# Patient Record
Sex: MALE | Race: WHITE | Employment: FULL TIME | URBAN - METROPOLITAN AREA
[De-identification: names, ages, dates, MRNs, and addresses within clinical notes are randomized per-mention and may not be internally consistent; named-entity substitution may affect disease eponyms.]

---

## 2017-06-08 ENCOUNTER — OFFICE VISIT (OUTPATIENT)
Dept: FAMILY MEDICINE | Facility: CLINIC | Age: 32
End: 2017-06-08
Payer: COMMERCIAL

## 2017-06-08 VITALS
BODY MASS INDEX: 45.1 KG/M2 | WEIGHT: 315 LBS | HEART RATE: 112 BPM | TEMPERATURE: 99.2 F | HEIGHT: 70 IN | DIASTOLIC BLOOD PRESSURE: 93 MMHG | SYSTOLIC BLOOD PRESSURE: 133 MMHG

## 2017-06-08 DIAGNOSIS — L03.116 CELLULITIS OF LEFT LOWER EXTREMITY: Primary | ICD-10-CM

## 2017-06-08 DIAGNOSIS — J30.1 SEASONAL ALLERGIC RHINITIS DUE TO POLLEN: ICD-10-CM

## 2017-06-08 PROCEDURE — 99214 OFFICE O/P EST MOD 30 MIN: CPT | Performed by: FAMILY MEDICINE

## 2017-06-08 RX ORDER — CEPHALEXIN 500 MG/1
500 CAPSULE ORAL 4 TIMES DAILY
Qty: 40 CAPSULE | Refills: 0 | Status: SHIPPED | OUTPATIENT
Start: 2017-06-08 | End: 2018-02-21

## 2017-06-08 NOTE — MR AVS SNAPSHOT
After Visit Summary   6/8/2017    Michel Bradshaw    MRN: [id number]           Patient Information     Date Of Birth          1985        Visit Information        Provider Department      6/8/2017 2:00 PM Margarette Lowery MD [facility]        Today's Diagnoses     Cellulitis of left lower extremity    -  1    Seasonal allergic rhinitis due to pollen           Follow-ups after your visit        Additional Services     ALLERGY/ASTHMA ADULT REFERRAL       Your provider has referred you to: G: [facility] [phone] [email or web address]    Please be aware that coverage of these services is subject to the terms and limitations of your health insurance plan.  Call member services at your health plan with any benefit or coverage questions.      Please bring the following with you to your appointment:    (1) Any X-Rays, CTs or MRIs which have been performed.  Contact the facility where they were done to arrange for  prior to your scheduled appointment.    (2) List of current medications  (3) This referral request   (4) Any documents/labs given to you for this referral                  Who to contact     If you have questions or need follow up information about today's clinic visit or your schedule please contact [facility] directly at [phone].  Normal or non-critical lab and imaging results will be communicated to you by MyChart, letter or phone within 4 business days after the clinic has received the results. If you do not hear from us within 7 days, please contact the clinic through MyChart or phone. If you have a critical or abnormal lab result, we will notify you by phone as soon as possible.  Submit refill requests through [employer] or call your pharmacy and they will forward the refill request to us. Please allow 3 business days for your refill to be completed.          Additional Information About Your Visit    "     InCasthart Information     "[employer] lets you send messages to your doctor, view your test results, renew your prescriptions, schedule appointments and more. To sign up, go to [email or web address] . Click on \"Log in\" on the left side of the screen, which will take you to the Welcome page. Then click on \"Sign up Now\" on the right side of the page.     You will be asked to enter the access code listed below, as well as some personal information. Please follow the directions to create your username and password.     Your access code is: [email or web address]  Expires: 2017  2:20 PM     Your access code will  in 90 days. If you need help or a new code, please call your [place] clinic or [phone].        Care EveryWhere ID     This is your Care EveryWhere ID. This could be used by other organizations to access your [place] medical records  [id number]        Your Vitals Were     Pulse Temperature Height BMI (Body Mass Index)          112 99.2  F (37.3  C) (Tympanic) 5' 10\" (1.778 m) 48.93 kg/m2         Blood Pressure from Last 3 Encounters:   17 (!) 133/93   12 146/86   08 142/84    Weight from Last 3 Encounters:   17 (!) 341 lb (154.7 kg)   12 288 lb 9.6 oz (130.9 kg)   08 (!) 325 lb 12.8 oz (147.8 kg)              We Performed the Following     ALLERGY/ASTHMA ADULT REFERRAL          Today's Medication Changes          These changes are accurate as of: 17  2:20 PM.  If you have any questions, ask your nurse or doctor.               Start taking these medicines.        Dose/Directions    cephALEXin 500 MG capsule   Commonly known as:  KEFLEX   Used for:  Cellulitis of left lower extremity   Started by:  Margarette Lowery MD        Dose:  500 mg   Take 1 capsule (500 mg) by mouth 4 times daily   Quantity:  40 capsule   Refills:  0            Where to get your medicines      These medications were sent to [address]" [facility] [address]     Phone:  [phone]     cephALEXin 500 MG capsule                Primary Care Provider Office Phone # Fax #    Margarette Liliane Lowery [phone]       [facility] [address] [facility] [address]        Thank you!     Thank you for choosing [facility]  for your care. Our goal is always to provide you with excellent care. Hearing back from our patients is one way we can continue to improve our services. Please take a few minutes to complete the written survey that you may receive in the mail after your visit with us. Thank you!             Your Updated Medication List - Protect others around you: Learn how to safely use, store and throw away your medicines at www.disposemymeds.org.          This list is accurate as of: 6/8/17  2:20 PM.  Always use your most recent med list.                   Brand Name Dispense Instructions for use    BENADRYL 25 MG capsule   Generic drug:  diphenhydrAMINE     56    1 CAPSULE EVERY 4 TO 6 HOURS AS NEEDED       cephALEXin 500 MG capsule    KEFLEX    40 capsule    Take 1 capsule (500 mg) by mouth 4 times daily

## 2017-06-08 NOTE — NURSING NOTE
"Initial BP (!) 133/93  Pulse 112  Temp 99.2  F (37.3  C) (Tympanic)  Ht 5' 10\" (1.778 m)  Wt (!) 341 lb (154.7 kg)  BMI 48.93 kg/m2 Estimated body mass index is 48.93 kg/(m^2) as calculated from the following:    Height as of this encounter: 5' 10\" (1.778 m).    Weight as of this encounter: 341 lb (154.7 kg). .      "

## 2017-06-08 NOTE — PROGRESS NOTES
"  SUBJECTIVE:                                                    Michel Bradshaw is 31 year old male   Chief Complaint   Patient presents with     Leg Pain     Left leg edema      Duration: 2 days    Description  Location: left leg    Intensity:  0/10 and no itching    Accompanying signs and symptoms: swelling and redness    History  Previous similar problem: no   Previous evaluation:  none    Precipitating or alleviating factors:  Trauma or overuse: no   Aggravating factors include: none    Therapies tried and outcome: nothing       Problem list and histories reviewed & adjusted, as indicated.  Additional history: many mosquito bites this weekend.    Patient Active Problem List   Diagnosis     Other acute otitis externa     Acute otitis media     History reviewed. No pertinent surgical history.    Social History   Substance Use Topics     Smoking status: Never Smoker     Smokeless tobacco: Never Used     Alcohol use Yes     History reviewed. No pertinent family history.      Current Outpatient Prescriptions   Medication Sig Dispense Refill     cephALEXin (KEFLEX) 500 MG capsule Take 1 capsule (500 mg) by mouth 4 times daily 40 capsule 0     BENADRYL 25 MG OR CAPS 1 CAPSULE EVERY 4 TO 6 HOURS AS NEEDED 56 0     No Known Allergies  No lab results found.   BP Readings from Last 3 Encounters:   06/08/17 (!) 133/93   09/20/12 146/86   05/22/08 142/84    Wt Readings from Last 3 Encounters:   06/08/17 (!) 341 lb (154.7 kg)   09/20/12 288 lb 9.6 oz (130.9 kg)   05/22/08 (!) 325 lb 12.8 oz (147.8 kg)         ROS:  Constitutional, HEENT, cardiovascular, pulmonary, gi and gu systems are negative, except as otherwise noted.    OBJECTIVE:                                                    BP (!) 133/93  Pulse 112  Temp 99.2  F (37.3  C) (Tympanic)  Ht 5' 10\" (1.778 m)  Wt (!) 341 lb (154.7 kg)  BMI 48.93 kg/m2  GENERAL APPEARANCE ADULT: Alert, no acute distress, morbidly obese  SKIN: rash present, type:macular, " appearance: red, location: left leg and ankle.  Many red inflamed excoriated mosquito bites on both legs.  Diagnostic Test Results:  none      ASSESSMENT/PLAN:                                                    1. Cellulitis of left lower extremity  Recheck in 2 days, if worse go to ED.  - cephALEXin (KEFLEX) 500 MG capsule; Take 1 capsule (500 mg) by mouth 4 times daily  Dispense: 40 capsule; Refill: 0    2. Seasonal allergic rhinitis due to pollen  Wishes work up for allergies.  - ALLERGY/ASTHMA ADULT REFERRAL    Margarette Lowery MD  [facility]

## 2017-06-08 NOTE — LETTER
[facility]  [address] [place] [address]  [phone]    RE:  Michel Bradshaw  [address] [address] [phone] (home)   June 8, 2017    TO WHOM IT MAY CONCERN:    Michel Bradshaw was seen on 6/8/2017.  Please excuse him until better due to illness.    Cordially,      ONEIL VELASQUEZ MD.

## 2017-06-12 ENCOUNTER — OFFICE VISIT (OUTPATIENT)
Dept: FAMILY MEDICINE | Facility: CLINIC | Age: 32
End: 2017-06-12
Payer: COMMERCIAL

## 2017-06-12 VITALS
DIASTOLIC BLOOD PRESSURE: 87 MMHG | TEMPERATURE: 98.4 F | HEIGHT: 70 IN | WEIGHT: 315 LBS | HEART RATE: 93 BPM | BODY MASS INDEX: 45.1 KG/M2 | SYSTOLIC BLOOD PRESSURE: 137 MMHG

## 2017-06-12 DIAGNOSIS — L03.119 CELLULITIS AND ABSCESS OF LEG: Primary | ICD-10-CM

## 2017-06-12 DIAGNOSIS — L02.419 CELLULITIS AND ABSCESS OF LEG: Primary | ICD-10-CM

## 2017-06-12 PROCEDURE — 99213 OFFICE O/P EST LOW 20 MIN: CPT | Performed by: FAMILY MEDICINE

## 2017-06-12 NOTE — PATIENT INSTRUCTIONS
Thank you for choosing [facility].  You may be receiving a survey in the mail from [employer] Prieto regarding your visit today.  Please take a few minutes to complete and return the survey to let us know how we are doing.      If you have questions or concerns, please contact us via [employer] or you can contact your care team at [phone].    Our Clinic hours are:  Monday 6:40 am  to 7:00 pm  Tuesday -Friday 6:40 am to 5:00 pm    The [place] outpatient lab hours are:  Monday - Friday 6:10 am to 4:45 pm  Saturdays 7:00 am to 11:00 am  Appointments are required, call [phone]    If you have clinical questions after hours or would like to schedule an appointment,  call the clinic at [phone].  Discharge Instructions for Cellulitis  You have been diagnosed with cellulitis. This is an infection in the deepest layer of the skin. In some cases, the infection also affects the muscle. Cellulitis is caused by bacteria. The bacteria can enter the body through broken skin. This can happen with a cut, scratch, animal bite, or an insect bite that has been scratched. You may have been treated in the hospital with antibiotics and fluids. You will likely be given a prescription for antibiotics to take at home. This sheet will help you take care of yourself at home.  Home Care  When you are home:    Take the prescribed antibiotic medication you are given as directed until it is gone. Take it even if you feel better. It treats the infection and stops it from returning. Not taking all of the medication can make future infections hard to treat.    Keep the infected area clean.    When possible, raise the infected area above the level of your heart. This helps keep swelling down.    Talk to your doctor if you are in pain. Ask what kind of over-the-counter medication you can take for pain.    Apply clean bandages as advised.    Take your temperature once a day for a week.    Wash your hands often to prevent spreading the  infection.  In the future, wash your hands before and after you touch cuts, scratches, or bandages. This will help prevent infection.   When to Call Your Doctor  Call your doctor immediately if you have any of the following:    Vomiting    Fever of100.4 F (38 C) or higher, or as directed by your health care provider    Shaking chills    Redness that gets worse in or around the infected area    Swelling of the infected area    Pain that gets worse in or around the infected area    Difficulty or pain when moving the joints above or below the infected area    Discharge or pus draining from the area     2896-1023 [employer]. [address] [place] [address]. All rights reserved. This information is not intended as a substitute for professional medical care. Always follow your healthcare professional's instructions.

## 2017-06-12 NOTE — PROGRESS NOTES
SUBJECTIVE:                                                    Michel Bradshaw is a 31 year old male who presents to clinic today for the following health issues:      Concern - Patient is here for a F/U Cellulitis L leg      Onset: 1 week ago     Description:   Patient was seen by Dr. Lowery and was but on Keflex is getting better need ok to go back to work     Intensity: mild    Progression of Symptoms:  improving    Accompanying Signs & Symptoms:  Bug bites        Previous history of similar problem:   no    Precipitating factors:   Worsened by: the infection from bug bites     Alleviating factors:  Improved by: keflex        Therapies Tried and outcome: Patient was seen on 6-8 in clinic and was treated L leg is getting better       Patient is a 31 yr old male here for a follow up on cellulitis. He was diagnosed on 6/8 and it appears that he is getting better. He reports that the redness and swelling are completely gone. He is still on antibiotics and he was advised to complete the antibiotics.       Problem list and histories reviewed & adjusted, as indicated.  Additional history: as documented    Patient Active Problem List   Diagnosis     Other acute otitis externa     Acute otitis media     History reviewed. No pertinent surgical history.    Social History   Substance Use Topics     Smoking status: Never Smoker     Smokeless tobacco: Never Used     Alcohol use Yes     History reviewed. No pertinent family history.      Current Outpatient Prescriptions   Medication Sig Dispense Refill     cephALEXin (KEFLEX) 500 MG capsule Take 1 capsule (500 mg) by mouth 4 times daily 40 capsule 0     BENADRYL 25 MG OR CAPS 1 CAPSULE EVERY 4 TO 6 HOURS AS NEEDED 56 0     No Known Allergies    Reviewed and updated as needed this visit by clinical staff  Tobacco  Allergies  Med Hx  Surg Hx  Fam Hx  Soc Hx      Reviewed and updated as needed this visit by Provider         ROS:  Constitutional, HEENT, cardiovascular,  "pulmonary, gi and gu systems are negative, except as otherwise noted.    OBJECTIVE:                                                    /87  Pulse 93  Temp 98.4  F (36.9  C) (Tympanic)  Ht 5' 10\" (1.778 m)  Wt (!) 341 lb (154.7 kg)  BMI 48.93 kg/m2  Body mass index is 48.93 kg/(m^2).  GENERAL: healthy, alert and no distress  NECK: no adenopathy, no asymmetry, masses, or scars and thyroid normal to palpation  RESP: lungs clear to auscultation - no rales, rhonchi or wheezes  CV: regular rate and rhythm, normal S1 S2, no S3 or S4, no murmur, click or rub, no peripheral edema and peripheral pulses strong  ABDOMEN: soft, nontender, no hepatosplenomegaly, no masses and bowel sounds normal  MS: +1 edema to knee and LLE exam shows no erythema, induration, or nodules    Diagnostic Test Results:  none      ASSESSMENT/PLAN:                                                      (L02.419,  L03.119) Cellulitis and abscess of leg  (primary encounter diagnosis)  Comment: Resolved  Plan: No change ,continue with antibiotics.     FUTURE APPOINTMENTS:       - Follow-up visit as needed    Trevor Matthews MD  [facility]  "

## 2017-06-12 NOTE — MR AVS SNAPSHOT
After Visit Summary   6/12/2017    Michel Bradshaw    MRN: [id number]           Patient Information     Date Of Birth          1985        Visit Information        Provider Department      6/12/2017 7:20 AM Trevor Matthews MD [facility]        Care Instructions          Thank you for choosing [facility].  You may be receiving a survey in the mail from [facility] regarding your visit today.  Please take a few minutes to complete and return the survey to let us know how we are doing.      If you have questions or concerns, please contact us via [employer] or you can contact your care team at [phone].    Our Clinic hours are:  Monday 6:40 am  to 7:00 pm  Tuesday -Friday 6:40 am to 5:00 pm    The [place] outpatient lab hours are:  Monday - Friday 6:10 am to 4:45 pm  Saturdays 7:00 am to 11:00 am  Appointments are required, call [phone]    If you have clinical questions after hours or would like to schedule an appointment,  call the clinic at [phone].  Discharge Instructions for Cellulitis  You have been diagnosed with cellulitis. This is an infection in the deepest layer of the skin. In some cases, the infection also affects the muscle. Cellulitis is caused by bacteria. The bacteria can enter the body through broken skin. This can happen with a cut, scratch, animal bite, or an insect bite that has been scratched. You may have been treated in the hospital with antibiotics and fluids. You will likely be given a prescription for antibiotics to take at home. This sheet will help you take care of yourself at home.  Home Care  When you are home:    Take the prescribed antibiotic medication you are given as directed until it is gone. Take it even if you feel better. It treats the infection and stops it from returning. Not taking all of the medication can make future infections hard to treat.    Keep the infected area clean.    When possible, raise the infected  area above the level of your heart. This helps keep swelling down.    Talk to your doctor if you are in pain. Ask what kind of over-the-counter medication you can take for pain.    Apply clean bandages as advised.    Take your temperature once a day for a week.    Wash your hands often to prevent spreading the infection.  In the future, wash your hands before and after you touch cuts, scratches, or bandages. This will help prevent infection.   When to Call Your Doctor  Call your doctor immediately if you have any of the following:    Vomiting    Fever of100.4 F (38 C) or higher, or as directed by your health care provider    Shaking chills    Redness that gets worse in or around the infected area    Swelling of the infected area    Pain that gets worse in or around the infected area    Difficulty or pain when moving the joints above or below the infected area    Discharge or pus draining from the area     6932-6206 [employer]. [address] [place] [address]. All rights reserved. This information is not intended as a substitute for professional medical care. Always follow your healthcare professional's instructions.                Follow-ups after your visit        Who to contact     If you have questions or need follow up information about today's clinic visit or your schedule please contact [facility] directly at [phone].  Normal or non-critical lab and imaging results will be communicated to you by MyChart, letter or phone within 4 business days after the clinic has received the results. If you do not hear from us within 7 days, please contact the clinic through MyChart or phone. If you have a critical or abnormal lab result, we will notify you by phone as soon as possible.  Submit refill requests through [employer] or call your pharmacy and they will forward the refill request to us. Please allow 3 business days for your refill to be completed.          Additional  "Information About Your Visit        MyChart Information     [employer] lets you send messages to your doctor, view your test results, renew your prescriptions, schedule appointments and more. To sign up, go to [email or web address] . Click on \"Log in\" on the left side of the screen, which will take you to the Welcome page. Then click on \"Sign up Now\" on the right side of the page.     You will be asked to enter the access code listed below, as well as some personal information. Please follow the directions to create your username and password.     Your access code is: [email or web address]  Expires: 2017  2:20 PM     Your access code will  in 90 days. If you need help or a new code, please call your [place] clinic or [phone].        Care EveryWhere ID     This is your Care EveryWhere ID. This could be used by other organizations to access your [place] medical records  [id number]        Your Vitals Were     Pulse Temperature Height BMI (Body Mass Index)          93 98.4  F (36.9  C) (Tympanic) 5' 10\" (1.778 m) 48.93 kg/m2         Blood Pressure from Last 3 Encounters:   17 137/87   17 (!) 133/93   12 146/86    Weight from Last 3 Encounters:   17 (!) 341 lb (154.7 kg)   17 (!) 341 lb (154.7 kg)   12 288 lb 9.6 oz (130.9 kg)              Today, you had the following     No orders found for display       Primary Care Provider Office Phone # Fax #    Margarettevidal Lowery -[phone]       [facility] [address] [facility] [address]        Thank you!     Thank you for choosing [facility]  for your care. Our goal is always to provide you with excellent care. Hearing back from our patients is one way we can continue to improve our services. Please take a few minutes to complete the written survey that you may receive in the mail after your visit with us. Thank you!             Your Updated Medication List - Protect others around you: " Learn how to safely use, store and throw away your medicines at www.disposemymeds.org.          This list is accurate as of: 6/12/17  7:53 AM.  Always use your most recent med list.                   Brand Name Dispense Instructions for use    BENADRYL 25 MG capsule   Generic drug:  diphenhydrAMINE     56    1 CAPSULE EVERY 4 TO 6 HOURS AS NEEDED       cephALEXin 500 MG capsule    KEFLEX    40 capsule    Take 1 capsule (500 mg) by mouth 4 times daily

## 2017-06-12 NOTE — NURSING NOTE
"Chief Complaint   Patient presents with     Cellulitis     L leg        Initial /87  Pulse 93  Temp 98.4  F (36.9  C) (Tympanic)  Ht 5' 10\" (1.778 m)  Wt (!) 341 lb (154.7 kg)  BMI 48.93 kg/m2 Estimated body mass index is 48.93 kg/(m^2) as calculated from the following:    Height as of this encounter: 5' 10\" (1.778 m).    Weight as of this encounter: 341 lb (154.7 kg).  Medication Reconciliation: complete  "

## 2018-02-21 ENCOUNTER — OFFICE VISIT (OUTPATIENT)
Dept: FAMILY MEDICINE | Facility: CLINIC | Age: 33
End: 2018-02-21
Payer: COMMERCIAL

## 2018-02-21 ENCOUNTER — OFFICE VISIT (OUTPATIENT)
Dept: DERMATOLOGY | Facility: CLINIC | Age: 33
End: 2018-02-21
Payer: COMMERCIAL

## 2018-02-21 VITALS
SYSTOLIC BLOOD PRESSURE: 141 MMHG | HEART RATE: 92 BPM | BODY MASS INDEX: 51.19 KG/M2 | HEIGHT: 71 IN | DIASTOLIC BLOOD PRESSURE: 97 MMHG

## 2018-02-21 VITALS
HEART RATE: 97 BPM | WEIGHT: 315 LBS | DIASTOLIC BLOOD PRESSURE: 88 MMHG | TEMPERATURE: 96.9 F | HEIGHT: 70 IN | BODY MASS INDEX: 45.1 KG/M2 | SYSTOLIC BLOOD PRESSURE: 149 MMHG

## 2018-02-21 DIAGNOSIS — I87.8 VENOUS STASIS: Primary | ICD-10-CM

## 2018-02-21 DIAGNOSIS — E66.01 MORBID OBESITY (H): ICD-10-CM

## 2018-02-21 DIAGNOSIS — I87.2 VENOUS STASIS DERMATITIS OF LEFT LOWER EXTREMITY: Primary | ICD-10-CM

## 2018-02-21 DIAGNOSIS — I83.892 VARICOSE VEINS OF LEFT LEG WITH EDEMA: ICD-10-CM

## 2018-02-21 DIAGNOSIS — I87.2 VENOUS STASIS DERMATITIS OF LEFT LOWER EXTREMITY: ICD-10-CM

## 2018-02-21 PROCEDURE — 99214 OFFICE O/P EST MOD 30 MIN: CPT | Performed by: NURSE PRACTITIONER

## 2018-02-21 PROCEDURE — 99203 OFFICE O/P NEW LOW 30 MIN: CPT | Performed by: PHYSICIAN ASSISTANT

## 2018-02-21 RX ORDER — TRIAMCINOLONE ACETONIDE 1 MG/G
CREAM TOPICAL
Qty: 45 G | Refills: 1 | Status: SHIPPED | OUTPATIENT
Start: 2018-02-21 | End: 2020-06-26

## 2018-02-21 NOTE — PATIENT INSTRUCTIONS
1. Apply Aquaphor or Eucerin cream to you leg  2. Schedule appointment with dermatology  3. Low slat diet  4. Monitor for an increase in redness/warmth and then call clinic          Thank you for choosing [facility].  You may be receiving a survey in the mail from [employer] Prieto regarding your visit today.  Please take a few minutes to complete and return the survey to let us know how we are doing.      If you have questions or concerns, please contact us via [email or web address] or you can contact your care team at [phone].    Our Clinic hours are:  Monday 6:40 am  to 7:00 pm  Tuesday -Friday 6:40 am to 5:00 pm    The [place] outpatient lab hours are:  Monday - Friday 6:10 am to 4:45 pm  Saturdays 7:00 am to 11:00 am  Appointments are required, call [phone]    If you have clinical questions after hours or would like to schedule an appointment,  call the clinic at [phone].

## 2018-02-21 NOTE — PROGRESS NOTES
Michel Bradshaw is a 32 year old year old male patient here today for consult on redness, swelling to left lower leg by Gabriela Fierro CNP.  Patient reports that he had Cellulitis after insect bites in June which resolved with Keflex. He noted mild redness and swelling on left leg for the past week, and reports that it does resolve after having his feet up at night but then returns. He denies any pain. Patient has no other skin complaints today.  Remainder of the HPI, Meds, PMH, Allergies, FH, and SH was reviewed in chart.   History reviewed. No pertinent past medical history.    History reviewed. No pertinent surgical history.     History reviewed. No pertinent family history.    Social History     Social History     Marital status: Single     Spouse name: N/A     Number of children: N/A     Years of education: N/A     Occupational History     Not on file.     Social History Main Topics     Smoking status: Never Smoker     Smokeless tobacco: Never Used     Alcohol use Yes     Drug use: Not on file     Sexual activity: Not on file     Other Topics Concern     Parent/Sibling W/ Cabg, Mi Or Angioplasty Before 65f 55m? No     Social History Narrative       Outpatient Encounter Prescriptions as of 2/21/2018   Medication Sig Dispense Refill     Elastic Bandages & Supports (JOBST FOR MEN KNEE HIGH/LG) MISC Dispense knee high 20-30 mm Hg jobst compression stocking 2 each 1     triamcinolone (KENALOG) 0.1 % cream Apply twice daily to rash on leg as needed. 45 g 1     BENADRYL 25 MG OR CAPS 1 CAPSULE EVERY 4 TO 6 HOURS AS NEEDED 56 0     No facility-administered encounter medications on file as of 2/21/2018.              Review Of Systems  Skin: As above  Eyes: negative  Ears/Nose/Throat: negative  Respiratory: No shortness of breath, dyspnea on exertion, cough, or hemoptysis  Cardiovascular: negative  Gastrointestinal: negative  Genitourinary: negative  Musculoskeletal: negative  Neurologic: negative  Psychiatric:  "negative  Hematologic/Lymphatic/Immunologic: negative  Endocrine: negative      O:   NAD, WDWN, Alert & Oriented, Mood & Affect wnl, Vitals stable   Here today alone   BP (!) 141/97 (BP Location: Left arm, Cuff Size: Adult Large)  Pulse 92  Ht 1.803 m (5' 11\")  BMI 51.19 kg/m2   General appearance normal   Vitals stable   Alert, oriented and in no acute distress     Varicose veins on left leg with 1+ pitting edema  Few blanchable red patches on leg   Prurigo nodule on left shin       Eyes: Conjunctivae/lids:Normal     ENT: Lips: normal    MSK:Normal    Pulm: Breathing Normal    Neuro/Psych: Orientation:Normal; Mood/Affect:Normal  A/P:  1. Venous stasis- venous stasis dermatitis  To reduce swelling in the leg   Don't stand for long periods.   Take regular walks.   Elevate your feet when sitting: if your legs are swollen they need to be above your hips to drain effectively.   Elevate the foot of your bed overnight.   Once the dermatitis is under control, wear special graduated compression stockings long term.    To treat the dermatitis   Topical Triamcinolone cream daily if rash is present   Return to clinic 2 weeks if not improving.   Use vanicream, Eucerin, or aquaphor daily  Try not to scratch: it keeps the dermatitis going.   Protect your skin from injury: this can result in infection or ulceration.    Gave patient prescription for compression stockings.   "

## 2018-02-21 NOTE — MR AVS SNAPSHOT
After Visit Summary   2/21/2018    Michel Bradshaw    MRN: [id number]           Patient Information     Date Of Birth          1985        Visit Information        Provider Department      2/21/2018 8:00 AM Shanice Ferguson PA-C [facility]        Today's Diagnoses     Venous stasis    -  1    Venous stasis dermatitis of left lower extremity          Care Instructions    To reduce swelling in the leg   Don't stand for long periods.   Take regular walks.   Elevate your feet when sitting: if your legs are swollen they need to be above your hips to drain effectively.   Elevate the foot of your bed overnight.   Once the dermatitis is under control, wear special graduated compression stockings long term.    To treat the dermatitis   Topical Triamcinolone cream daily  Return to clinic 2 weeks  Use vanicream daily  Try not to scratch: it keeps the dermatitis going.   Protect your skin from injury: this can result in infection or ulceration.              Follow-ups after your visit        Your next 10 appointments already scheduled     Feb 21, 2018  8:00 AM CST   New Visit with Shanice Ferguson PA-C   [facility]    [address] [address] [phone]            Feb 28, 2018  8:45 AM CST   Nurse Only with [place] FP/IM RN   [facility]    [address] [address] [phone]              Who to contact     If you have questions or need follow up information about today's clinic visit or your schedule please contact [facility] directly at [phone].  Normal or non-critical lab and imaging results will be communicated to you by MyChart, letter or phone within 4 business days after the clinic has received the results. If you do not hear from us within 7 days, please contact the clinic through MyChart or phone. If you have a critical or  "abnormal lab result, we will notify you by phone as soon as possible.  Submit refill requests through [employer] or call your pharmacy and they will forward the refill request to us. Please allow 3 business days for your refill to be completed.          Additional Information About Your Visit        [employer] Information     [employer] lets you send messages to your doctor, view your test results, renew your prescriptions, schedule appointments and more. To sign up, go to [email or web address] . Click on \"Log in\" on the left side of the screen, which will take you to the Welcome page. Then click on \"Sign up Now\" on the right side of the page.     You will be asked to enter the access code listed below, as well as some personal information. Please follow the directions to create your username and password.     Your access code is: [email or web address]  Expires: 2018  7:19 AM     Your access code will  in 90 days. If you need help or a new code, please call your [place] clinic or [phone].        Care EveryWhere ID     This is your Care EveryWhere ID. This could be used by other organizations to access your [place] medical records  [id number]        Your Vitals Were     Pulse Height BMI (Body Mass Index)             92 1.803 m (5' 11\") 51.19 kg/m2          Blood Pressure from Last 3 Encounters:   18 (!) 141/97   18 149/88   17 137/87    Weight from Last 3 Encounters:   18 (!) 166.5 kg (367 lb)   17 (!) 154.7 kg (341 lb)   17 (!) 154.7 kg (341 lb)              Today, you had the following     No orders found for display         Today's Medication Changes          These changes are accurate as of 18  7:59 AM.  If you have any questions, ask your nurse or doctor.               Start taking these medicines.        Dose/Directions    JOBST FOR MEN KNEE HIGH/LG Misc   Used for:  Venous stasis, Venous stasis dermatitis of left lower extremity   Started by:  Shanice Ferguson " JERMAN Mcmahon        Dispense knee high 20-30 mm Hg jobst compression stocking   Quantity:  2 each   Refills:  1       triamcinolone 0.1 % cream   Commonly known as:  KENALOG   Used for:  Venous stasis dermatitis of left lower extremity   Started by:  Shanice Ferguson PA-C        Apply twice daily to rash on leg as needed.   Quantity:  45 g   Refills:  1         Stop taking these medicines if you haven't already. Please contact your care team if you have questions.     cephALEXin 500 MG capsule   Commonly known as:  KEFLEX   Stopped by:  Gabriela Fierro, SHARAD LANGLEY                Where to get your medicines      These medications were sent to [address] [address]     Phone:  [phone]     triamcinolone 0.1 % cream         Some of these will need a paper prescription and others can be bought over the counter.  Ask your nurse if you have questions.     Bring a paper prescription for each of these medications     JOBST FOR MEN KNEE HIGH/LG [facility]                Primary Care Provider Office Phone # Fax #    Margarette Liliane Lowery [phone] [address] [facility] [address]        Equal Access to Services     SARAH BETH ESCOBEDO AH: Hadii josefa rees hadasho Soomaali, waaxda luqadaha, qaybta kaalmada adeegyada, philippe rutledge. So [facility] [phone].    ATENCIÓN: Si habla español, tiene a ward disposición servicios gratuitos de asistencia lingüística. Llame al [phone].    We comply with applicable federal civil rights laws and [place] laws. We do not discriminate on the basis of race, color, national origin, age, disability, sex, sexual orientation, or gender identity.            Thank you!     Thank you for choosing [facility]  for your care. Our goal is always to provide you with excellent care. Hearing back from our patients is one way we can continue to improve our services. Please  take a few minutes to complete the written survey that you may receive in the mail after your visit with us. Thank you!             Your Updated Medication List - Protect others around you: Learn how to safely use, store and throw away your medicines at www.disposemymeds.org.          This list is accurate as of 2/21/18  7:59 AM.  Always use your most recent med list.                   Brand Name Dispense Instructions for use Diagnosis    BENADRYL 25 MG capsule   Generic drug:  diphenhydrAMINE     56    1 CAPSULE EVERY 4 TO 6 HOURS AS NEEDED        JOBST FOR MEN KNEE HIGH/LG Misc     2 each    Dispense knee high 20-30 mm Hg jobst compression stocking    Venous stasis, Venous stasis dermatitis of left lower extremity       triamcinolone 0.1 % cream    KENALOG    45 g    Apply twice daily to rash on leg as needed.    Venous stasis dermatitis of left lower extremity

## 2018-02-21 NOTE — PATIENT INSTRUCTIONS
To reduce swelling in the leg   Don't stand for long periods.   Take regular walks.   Elevate your feet when sitting: if your legs are swollen they need to be above your hips to drain effectively.   Elevate the foot of your bed overnight.   Once the dermatitis is under control, wear special graduated compression stockings long term.    To treat the dermatitis   Topical Triamcinolone cream daily  Return to clinic 2 weeks  Use vanicream daily  Try not to scratch: it keeps the dermatitis going.   Protect your skin from injury: this can result in infection or ulceration.

## 2018-02-21 NOTE — NURSING NOTE
"Chief Complaint   Patient presents with     Derm Problem     Cellulitis       Initial BP (!) 141/97 (BP Location: Left arm, Cuff Size: Adult Large)  Pulse 92  Ht 1.803 m (5' 11\")  BMI 51.19 kg/m2 Estimated body mass index is 51.19 kg/(m^2) as calculated from the following:    Height as of this encounter: 1.803 m (5' 11\").    Weight as of an earlier encounter on 2/21/18: 166.5 kg (367 lb).  Medication Reconciliation: complete    "

## 2018-02-21 NOTE — MR AVS SNAPSHOT
After Visit Summary   2/21/2018    Michel Bradshaw    MRN: [id number]           Patient Information     Date Of Birth          1985        Visit Information        Provider Department      2/21/2018 7:00 AM Gabriela Fierro APRN [facility]        Today's Diagnoses     Venous stasis dermatitis of left lower extremity    -  1      Care Instructions    1. Apply Aquaphor or Eucerin cream to you leg  2. Schedule appointment with dermatology  3. Low slat diet  4. Monitor for an increase in redness/warmth and then call clinic          Thank you for choosing [facility].  You may be receiving a survey in the mail from [employer] regarding your visit today.  Please take a few minutes to complete and return the survey to let us know how we are doing.      If you have questions or concerns, please contact us via [employer] or you can contact your care team at [phone].    Our Clinic hours are:  Monday 6:40 am  to 7:00 pm  Tuesday -Friday 6:40 am to 5:00 pm    The [place] outpatient lab hours are:  Monday - Friday 6:10 am to 4:45 pm  Saturdays 7:00 am to 11:00 am  Appointments are required, call [phone]    If you have clinical questions after hours or would like to schedule an appointment,  call the clinic at [phone].          Follow-ups after your visit        Additional Services     DERMATOLOGY REFERRAL       Your provider has referred you to: [facility]: [facility] [phone]   [email or web address]    Please be aware that coverage of these services is subject to the terms and limitations of your health insurance plan.  Call member services at your health plan with any benefit or coverage questions.      Please bring the following with you to your appointment:    (1) Any X-Rays, CTs or MRIs which have been performed.  Contact the facility where they were done to arrange for  prior to your scheduled appointment.    (2)  "List of current medications  (3) This referral request   (4) Any documents/labs given to you for this referral                  Who to contact     If you have questions or need follow up information about today's clinic visit or your schedule please contact [facility] directly at [phone].  Normal or non-critical lab and imaging results will be communicated to you by MyChart, letter or phone within 4 business days after the clinic has received the results. If you do not hear from us within 7 days, please contact the clinic through MyChart or phone. If you have a critical or abnormal lab result, we will notify you by phone as soon as possible.  Submit refill requests through [employer] or call your pharmacy and they will forward the refill request to us. Please allow 3 business days for your refill to be completed.          Additional Information About Your Visit        MyChart Information     [employer] lets you send messages to your doctor, view your test results, renew your prescriptions, schedule appointments and more. To sign up, go to [email or web address] . Click on \"Log in\" on the left side of the screen, which will take you to the Welcome page. Then click on \"Sign up Now\" on the right side of the page.     You will be asked to enter the access code listed below, as well as some personal information. Please follow the directions to create your username and password.     Your access code is: [email or web address]  Expires: 2018  7:19 AM     Your access code will  in 90 days. If you need help or a new code, please call your [facility] or [phone].        Care EveryWhere ID     This is your Care EveryWhere ID. This could be used by other organizations to access your [place] medical records  [id number]        Your Vitals Were     Pulse Temperature Height BMI (Body Mass Index)          97 96.9  F (36.1  C) (Tympanic) 5' 10\" (1.778 m) 52.66 kg/m2         Blood Pressure from Last 3 " Encounters:   02/21/18 (!) 150/99   06/12/17 137/87   06/08/17 (!) 133/93    Weight from Last 3 Encounters:   02/21/18 (!) 367 lb (166.5 kg)   06/12/17 (!) 341 lb (154.7 kg)   06/08/17 (!) 341 lb (154.7 kg)              We Performed the Following     DERMATOLOGY REFERRAL          Today's Medication Changes          These changes are accurate as of 2/21/18  7:19 AM.  If you have any questions, ask your nurse or doctor.               Stop taking these medicines if you haven't already. Please contact your care team if you have questions.     cephALEXin 500 MG capsule   Commonly known as:  KEFLEX   Stopped by:  Gabriela Fierro APRN CNP                    Primary Care Provider Office Phone # Fax #    Margarette Liliane Lowery [phone] [address] [facility] [address]        Equal Access to Services     SARAH BETH ESCOBEDO AH: Hadii josefa ku hadasho Soomaali, waaxda luqadaha, qaybta kaalmada adeegyada, waxglynn johnsonin hayalyssa penn . So [facility] [phone].    ATENCIÓN: Si habla español, tiene a ward disposición servicios gratuitos de asistencia lingüística. Llame al [phone].    We comply with applicable federal civil rights laws and [place] laws. We do not discriminate on the basis of race, color, national origin, age, disability, sex, sexual orientation, or gender identity.            Thank you!     Thank you for choosing [facility]  for your care. Our goal is always to provide you with excellent care. Hearing back from our patients is one way we can continue to improve our services. Please take a few minutes to complete the written survey that you may receive in the mail after your visit with us. Thank you!             Your Updated Medication List - Protect others around you: Learn how to safely use, store and throw away your medicines at www.disposemymeds.org.          This list is accurate as of 2/21/18  7:19 AM.  Always use your most recent med list.                    Brand Name Dispense Instructions for use Diagnosis    BENADRYL 25 MG capsule   Generic drug:  diphenhydrAMINE     56    1 CAPSULE EVERY 4 TO 6 HOURS AS NEEDED

## 2018-02-21 NOTE — PROGRESS NOTES
"  SUBJECTIVE:   Michel Bradshaw is a 32 year old male who presents to clinic today for the following health issues:    Chief Complaint   Patient presents with     Cellulitis     follow up, a few red spots still, wants checked out to make sure that its healing properly, last seen 6/2017     Patient was treated for cellulitis of left leg in June with Keflex-  He is concerned that his skin in certain area is getting red. No fevers, no drainage, no injury.     -------------------------------------    Problem list and histories reviewed & adjusted, as indicated.  Additional history: as documented    Patient Active Problem List   Diagnosis     Other acute otitis externa     Acute otitis media     History reviewed. No pertinent surgical history.    Social History   Substance Use Topics     Smoking status: Never Smoker     Smokeless tobacco: Never Used     Alcohol use Yes     History reviewed. No pertinent family history.        Reviewed and updated as needed this visit by clinical staff  Tobacco  Allergies  Med Hx  Surg Hx  Fam Hx  Soc Hx      Reviewed and updated as needed this visit by Provider         ROS:  Constitutional, HEENT, cardiovascular, pulmonary, GI, , musculoskeletal, neuro, skin, endocrine and psych systems are negative, except as otherwise noted.    OBJECTIVE:     /88 (BP Location: Left arm, Patient Position: Sitting, Cuff Size: Adult Large)  Pulse 97  Temp 96.9  F (36.1  C) (Tympanic)  Ht 5' 10\" (1.778 m)  Wt (!) 367 lb (166.5 kg)  BMI 52.66 kg/m2  Body mass index is 52.66 kg/(m^2).  GENERAL: alert, no distress and obese  MS: no gross musculoskeletal defects noted, no edema  SKIN: Left LE- with 3-4 scattered quarter sized slightly pigmented area- no warmth or drainage or edema noted.  No streaking.      Diagnostic Test Results:  none     ASSESSMENT/PLAN:       1. Venous stasis dermatitis of left lower extremity  - patient concerned that he is again developing cellulitis like he did 8 " months ago however no concern at this time- encourage him to watch for streaking/increase in redness  - Apply Aquaphor/Eucerin cream  - DERMATOLOGY REFERRAL    2. Morbid Obesity  - discussed with patient contributing factor to venous stasis dermatitis is his weight and that he needs to lose weight   - encouraged to lose weight    SHARAD Lynne [facility]

## 2018-02-21 NOTE — LETTER
2/21/2018         RE: Michel Bradshaw  [address] [address]        Dear Colleague,    Thank you for referring your patient, Michel Bradshaw, to the [facility]. Please see a copy of my visit note below.    Michel Bradshaw is a 32 year old year old male patient here today for consult on redness, swelling to left lower leg by Gabriela Fierro CNP.  Patient reports that he had Cellulitis after insect bites in June which resolved with Keflex. He noted mild redness and swelling on left leg for the past week, and reports that it does resolve after having his feet up at night but then returns. He denies any pain. Patient has no other skin complaints today.  Remainder of the HPI, Meds, PMH, Allergies, FH, and SH was reviewed in chart.   History reviewed. No pertinent past medical history.    History reviewed. No pertinent surgical history.     History reviewed. No pertinent family history.    Social History     Social History     Marital status: Single     Spouse name: N/A     Number of children: N/A     Years of education: N/A     Occupational History     Not on file.     Social History Main Topics     Smoking status: Never Smoker     Smokeless tobacco: Never Used     Alcohol use Yes     Drug use: Not on file     Sexual activity: Not on file     Other Topics Concern     Parent/Sibling W/ Cabg, Mi Or Angioplasty Before 65f 55m? No     Social History Narrative       Outpatient Encounter Prescriptions as of 2/21/2018   Medication Sig Dispense Refill     Elastic Bandages & Supports (JOBST FOR MEN KNEE HIGH/LG) MISC Dispense knee high 20-30 mm Hg jobst compression stocking 2 each 1     triamcinolone (KENALOG) 0.1 % cream Apply twice daily to rash on leg as needed. 45 g 1     BENADRYL 25 MG OR CAPS 1 CAPSULE EVERY 4 TO 6 HOURS AS NEEDED 56 0     No facility-administered encounter medications on file as of 2/21/2018.              Review Of Systems  Skin: As above  Eyes:  "negative  Ears/Nose/Throat: negative  Respiratory: No shortness of breath, dyspnea on exertion, cough, or hemoptysis  Cardiovascular: negative  Gastrointestinal: negative  Genitourinary: negative  Musculoskeletal: negative  Neurologic: negative  Psychiatric: negative  Hematologic/Lymphatic/Immunologic: negative  Endocrine: negative      O:   NAD, WDWN, Alert & Oriented, Mood & Affect wnl, Vitals stable   Here today alone   BP (!) 141/97 (BP Location: Left arm, Cuff Size: Adult Large)  Pulse 92  Ht 1.803 m (5' 11\")  BMI 51.19 kg/m2   General appearance normal   Vitals stable   Alert, oriented and in no acute distress     Varicose veins on left leg with 1+ pitting edema  Few blanchable red patches on leg   Prurigo nodule on left shin       Eyes: Conjunctivae/lids:Normal     ENT: Lips: normal    MSK:Normal    Pulm: Breathing Normal    Neuro/Psych: Orientation:Normal; Mood/Affect:Normal  A/P:  1. Venous stasis- venous stasis dermatitis  To reduce swelling in the leg   Don't stand for long periods.   Take regular walks.   Elevate your feet when sitting: if your legs are swollen they need to be above your hips to drain effectively.   Elevate the foot of your bed overnight.   Once the dermatitis is under control, wear special graduated compression stockings long term.    To treat the dermatitis   Topical Triamcinolone cream daily if rash is present   Return to clinic 2 weeks if not improving.   Use vanicream, Eucerin, or aquaphor daily  Try not to scratch: it keeps the dermatitis going.   Protect your skin from injury: this can result in infection or ulceration.    Gave patient prescription for compression stockings.     Again, thank you for allowing me to participate in the care of your patient.        Sincerely,        Shanice Harry PA-C    "

## 2018-02-26 PROBLEM — E66.01 MORBID OBESITY (H): Status: ACTIVE | Noted: 2018-02-26

## 2018-02-28 ENCOUNTER — ALLIED HEALTH/NURSE VISIT (OUTPATIENT)
Dept: FAMILY MEDICINE | Facility: CLINIC | Age: 33
End: 2018-02-28
Payer: COMMERCIAL

## 2018-02-28 VITALS — DIASTOLIC BLOOD PRESSURE: 78 MMHG | SYSTOLIC BLOOD PRESSURE: 126 MMHG

## 2018-02-28 DIAGNOSIS — I10 HTN (HYPERTENSION): Primary | ICD-10-CM

## 2018-02-28 PROCEDURE — 99207 ZZC NO CHARGE LOS: CPT

## 2018-02-28 NOTE — MR AVS SNAPSHOT
"              After Visit Summary   2018    Michel Bradshaw    MRN: [id number]           Patient Information     Date Of Birth          1985        Visit Information        Provider Department      2018 8:45 AM MANN DOE/HELADIO BENNETT [facility]        Today's Diagnoses     HTN (hypertension)    -  1       Follow-ups after your visit        Who to contact     If you have questions or need follow up information about today's clinic visit or your schedule please contact [facility] directly at [phone].  Normal or non-critical lab and imaging results will be communicated to you by MyChart, letter or phone within 4 business days after the clinic has received the results. If you do not hear from us within 7 days, please contact the clinic through [employer] or phone. If you have a critical or abnormal lab result, we will notify you by phone as soon as possible.  Submit refill requests through [employer] or call your pharmacy and they will forward the refill request to us. Please allow 3 business days for your refill to be completed.          Additional Information About Your Visit        MyChart Information     [employer]ally lets you send messages to your doctor, view your test results, renew your prescriptions, schedule appointments and more. To sign up, go to [email or web address] . Click on \"Log in\" on the left side of the screen, which will take you to the Welcome page. Then click on \"Sign up Now\" on the right side of the page.     You will be asked to enter the access code listed below, as well as some personal information. Please follow the directions to create your username and password.     Your access code is: [email or web address]  Expires: 2018  7:19 AM     Your access code will  in 90 days. If you need help or a new code, please call your [facility] or [phone].        Care EveryWhere ID     This is your Care EveryWhere ID. This could be used by other organizations to " access your [facility] medical records  [id number]         Blood Pressure from Last 3 Encounters:   02/28/18 126/78   02/21/18 (!) 141/97   02/21/18 149/88    Weight from Last 3 Encounters:   02/21/18 (!) 367 lb (166.5 kg)   06/12/17 (!) 341 lb (154.7 kg)   06/08/17 (!) 341 lb (154.7 kg)              Today, you had the following     No orders found for display       Primary Care Provider Office Phone # Fax #    Margarette Liliane Lowery [phone] [address] [facility] [address]        Equal Access to Services     SARAH BETH ESCOBEDO : Hadii josefa forrestero Sozeeshan, waaxda addiqrhona, qaybta kaalmada adejudy, philippe rutledge. So [facility] [phone].    ATENCIÓN: Si habla español, tiene a ward disposición servicios gratuitos de asistencia lingüística. Llame al [phone].    We comply with applicable federal civil rights laws and [place] laws. We do not discriminate on the basis of race, color, national origin, age, disability, sex, sexual orientation, or gender identity.            Thank you!     Thank you for choosing [facility]  for your care. Our goal is always to provide you with excellent care. Hearing back from our patients is one way we can continue to improve our services. Please take a few minutes to complete the written survey that you may receive in the mail after your visit with us. Thank you!             Your Updated Medication List - Protect others around you: Learn how to safely use, store and throw away your medicines at www.disposemymeds.org.          This list is accurate as of 2/28/18  8:48 AM.  Always use your most recent med list.                   Brand Name Dispense Instructions for use Diagnosis    BENADRYL 25 MG capsule   Generic drug:  diphenhydrAMINE     56    1 CAPSULE EVERY 4 TO 6 HOURS AS NEEDED        JOBST FOR MEN KNEE HIGH/LG Misc     2 each    Dispense knee high 20-30 mm Hg jobst compression stocking    Venous stasis, Venous  stasis dermatitis of left lower extremity       triamcinolone 0.1 % cream    KENALOG    45 g    Apply twice daily to rash on leg as needed.    Venous stasis dermatitis of left lower extremity

## 2018-02-28 NOTE — NURSING NOTE
Patient is here to have his Blood pressure rechecked. He is not on any BP medications and denies nosebleeds or headaches. Advised to try to cut back on salty foods and try spices other than salt to season his food.     I reviewed symptoms of Myocardial infarction and CVA with him and stressed importance of keeping BP < 140/90.     Patient verbalized understanding. Sara Ontiveros RN

## 2018-04-21 ENCOUNTER — HOSPITAL ENCOUNTER (EMERGENCY)
Facility: CLINIC | Age: 33
Discharge: HOME | End: 2018-04-21
Attending: EMERGENCY MEDICINE | Admitting: EMERGENCY MEDICINE
Payer: COMMERCIAL

## 2018-04-21 VITALS
OXYGEN SATURATION: 98 % | HEART RATE: 96 BPM | HEIGHT: 71 IN | SYSTOLIC BLOOD PRESSURE: 166 MMHG | TEMPERATURE: 97.7 F | DIASTOLIC BLOOD PRESSURE: 103 MMHG | RESPIRATION RATE: 16 BRPM

## 2018-04-21 DIAGNOSIS — L02.212 CUTANEOUS ABSCESS OF BACK EXCLUDING BUTTOCKS: ICD-10-CM

## 2018-04-21 PROCEDURE — 10060 I&D ABSCESS SIMPLE/SINGLE: CPT | Performed by: EMERGENCY MEDICINE

## 2018-04-21 PROCEDURE — 99283 EMERGENCY DEPT VISIT LOW MDM: CPT | Mod: 25 | Performed by: EMERGENCY MEDICINE

## 2018-04-21 PROCEDURE — 10060 I&D ABSCESS SIMPLE/SINGLE: CPT | Mod: Z6 | Performed by: EMERGENCY MEDICINE

## 2018-04-21 PROCEDURE — 99282 EMERGENCY DEPT VISIT SF MDM: CPT | Mod: 25 | Performed by: EMERGENCY MEDICINE

## 2018-04-21 NOTE — DISCHARGE INSTRUCTIONS
Abscess (Incision & Drainage)  An abscess is sometimes called a boil. It happens when bacteria get trapped under the skin and start to grow. Pus forms inside the abscess as the body responds to the bacteria. An abscess can happen with an insect bite, ingrown hair, blocked oil gland, pimple, cyst, or puncture wound.  Your healthcare provider has drained the pus from your abscess. If the abscess pocket was large, your healthcare provider may have put in gauze packing. Your provider will need to remove it on your next visit. He or she may also replace it at that time. You may not need antibiotics to treat a simple abscess, unless the infection is spreading into the skin around the wound (cellulitis).  The wound will take about 1 to 2 weeks to heal, depending on the size of the abscess. Healthy tissue will grow from the bottom and sides of the opening until it seals over.  Home care  These tips can help your wound heal:    The wound may drain for the first 2 days. Cover the wound with a clean dry dressing. Change the dressing if it becomes soaked with blood or pus.    If a gauze packing was placed inside the abscess pocket, you may be told to remove it yourself. You may do this in the shower. Once the packing is removed, you should wash the area in the shower, or clean the area as directed by your provider. Continue to do this until the skin opening has closed. Make sure you wash your hands after changing the packing or cleaning the wound.    If you were prescribed antibiotics, take them as directed until they are all gone.    You may use acetaminophen or ibuprofen to control pain, unless another pain medicine was prescribed. If you have liver disease or ever had a stomach ulcer, talk with your doctor before using these medicines.  Follow-up care  Follow up with your healthcare provider, or as advised. If a gauze packing was put in your wound, it should be removed in 1 to 2 days. Check your wound every day for any  signs that the infection is getting worse. The signs are listed below.  When to seek medical advice  Call your healthcare provider right away if any of these occur:    Increasing redness or swelling    Red streaks in the skin leading away from the wound    Increasing local pain or swelling    Continued pus draining from the wound 2 days after treatment    Fever of 100.4 F (38 C) or higher, or as directed by your healthcare provider    Boil returns when you are at home  Date Last Reviewed: 9/1/2016 2000-2017 [employer]. [address] [place] [address]. All rights reserved. This information is not intended as a substitute for professional medical care. Always follow your healthcare professional's instructions.      Pull packing tomorrow, warm packs 4-5 times daily for the next 2-3 days

## 2018-04-21 NOTE — ED AVS SNAPSHOT
[facility] Emergency Department    [address] [address]    Phone:  [phone]    Fax:  [phone]                                       Michel Bradshaw   MRN: [id number]    Department:  [facility] Emergency Department   Date of Visit:  4/21/2018           After Visit Summary Signature Page     I have received my discharge instructions, and my questions have been answered. I have discussed any challenges I see with this plan with the nurse or doctor.    ..........................................................................................................................................  Patient/Patient Representative Signature      ..........................................................................................................................................  Patient Representative Print Name and Relationship to Patient    ..................................................               ................................................  Date                                            Time    ..........................................................................................................................................  Reviewed by Signature/Title    ...................................................              ..............................................  Date                                                            Time

## 2018-04-21 NOTE — ED PROVIDER NOTES
"  History     Chief Complaint   Patient presents with     Wound Infection     wound to the middle of his back x 2 weeks. now draining     HPI  Michel Bradshaw is a 32 year old male who presents with swelling and pain upper mid back, present for the past week, no prior cutaneous abscesses.  Denies fever, nausea, faintness.    Problem List:    Patient Active Problem List    Diagnosis Date Noted     Morbid obesity (H) 02/26/2018     Priority: Medium     Other acute otitis externa 05/22/2008     Priority: Medium     Acute otitis media 05/22/2008     Priority: Medium        Past Medical History:    No past medical history on file.    Past Surgical History:    No past surgical history on file.    Family History:    No family history on file.    Social History:  Marital Status:  Single [1]  Social History   Substance Use Topics     Smoking status: Never Smoker     Smokeless tobacco: Never Used     Alcohol use Yes        Medications:      BENADRYL 25 MG OR CAPS   Elastic Bandages & Supports (JOBST FOR MEN KNEE HIGH/LG) MISC   triamcinolone (KENALOG) 0.1 % cream         Review of Systems  Problem focused review of systems otherwise negative    Physical Exam   BP: (!) 166/103  Pulse: 96  Temp: 97.7  F (36.5  C)  Resp: 16  Height: 180.3 cm (5' 11\")  SpO2: 98 %      Physical Exam  Nontoxic-appearing respiratory distress alert and oriented  Examination of her mid back shows area of redness, swelling tenderness centrally measures 4 cm with 2 cm surrounding erythema, central fluctuance.  23  ED Course     ED Course     Procedures               Critical Care time:  none  Procedure: Incision and drainage abscess  Area was prepped with Betadine ×3, anesthetized with 8 cc 0.5% bupivacaine, 1 cm skin line incision central at aspect of the abscess, moderate amount.  The degree expressed, bluntly probed to break up loculations irrigated with saline. Packed with 1/2\" iodoform gauze            No results found for this or any previous " visit (from the past 24 hour(s)).    Medications - No data to display    Assessments & Plan (with Medical Decision Making)  Cutaneous abscess for the past week. I &D, no complications. Return criteria reviewed     I have reviewed the nursing notes.    I have reviewed the findings, diagnosis, plan and need for follow up with the patient.       New Prescriptions    No medications on file       Final diagnoses:   Cutaneous abscess of back excluding buttocks       4/21/2018   [facility] EMERGENCY DEPARTMENT     Roderick Shaikh MD  04/21/18 [id number]

## 2018-04-21 NOTE — ED AVS SNAPSHOT
[facility] Emergency Department    [address] [facility] [facility] [address]    Phone:  [phone]    Fax:  [phone]                                       Michel Bradshaw   MRN: [id number]    Department:  [facility] Emergency Department   Date of Visit:  4/21/2018           Patient Information     Date Of Birth          1985        Your diagnoses for this visit were:     Cutaneous abscess of back excluding buttocks        You were seen by Roderick Shaikh MD.        Discharge Instructions         Abscess (Incision & Drainage)  An abscess is sometimes called a boil. It happens when bacteria get trapped under the skin and start to grow. Pus forms inside the abscess as the body responds to the bacteria. An abscess can happen with an insect bite, ingrown hair, blocked oil gland, pimple, cyst, or puncture wound.  Your healthcare provider has drained the pus from your abscess. If the abscess pocket was large, your healthcare provider may have put in gauze packing. Your provider will need to remove it on your next visit. He or she may also replace it at that time. You may not need antibiotics to treat a simple abscess, unless the infection is spreading into the skin around the wound (cellulitis).  The wound will take about 1 to 2 weeks to heal, depending on the size of the abscess. Healthy tissue will grow from the bottom and sides of the opening until it seals over.  Home care  These tips can help your wound heal:    The wound may drain for the first 2 days. Cover the wound with a clean dry dressing. Change the dressing if it becomes soaked with blood or pus.    If a gauze packing was placed inside the abscess pocket, you may be told to remove it yourself. You may do this in the shower. Once the packing is removed, you should wash the area in the shower, or clean the area as directed by your provider. Continue to do this until the skin opening has closed. Make sure you wash your hands after  changing the packing or cleaning the wound.    If you were prescribed antibiotics, take them as directed until they are all gone.    You may use acetaminophen or ibuprofen to control pain, unless another pain medicine was prescribed. If you have liver disease or ever had a stomach ulcer, talk with your doctor before using these medicines.  Follow-up care  Follow up with your healthcare provider, or as advised. If a gauze packing was put in your wound, it should be removed in 1 to 2 days. Check your wound every day for any signs that the infection is getting worse. The signs are listed below.  When to seek medical advice  Call your healthcare provider right away if any of these occur:    Increasing redness or swelling    Red streaks in the skin leading away from the wound    Increasing local pain or swelling    Continued pus draining from the wound 2 days after treatment    Fever of 100.4 F (38 C) or higher, or as directed by your healthcare provider    Boil returns when you are at home  Date Last Reviewed: 9/1/2016 2000-2017 [employer]. [address] [place] [address]. All rights reserved. This information is not intended as a substitute for professional medical care. Always follow your healthcare professional's instructions.      Pull packing tomorrow, warm packs 4-5 times daily for the next 2-3 days    24 Hour Appointment Hotline       To make an appointment at any [place] clinic, call [phone] ([phone]). If you don't have a family doctor or clinic, we will help you find one. [place] clinics are conveniently located to serve the needs of you and your family.             Review of your medicines      Our records show that you are taking the medicines listed below. If these are incorrect, please call your family doctor or clinic.        Dose / Directions Last dose taken    BENADRYL 25 MG capsule   Quantity:  56   Generic drug:  diphenhydrAMINE        1 CAPSULE EVERY 4 TO 6  "HOURS AS NEEDED   Refills:  0        JOBST FOR MEN KNEE HIGH/LG Misc   Quantity:  2 each        Dispense knee high 20-30 mm Hg jobst compression stocking   Refills:  1        triamcinolone 0.1 % cream   Commonly known as:  KENALOG   Quantity:  45 g        Apply twice daily to rash on leg as needed.   Refills:  1                Orders Needing Specimen Collection     None      Pending Results     No orders found from 4/19/2018 to 4/22/2018.            Pending Culture Results     No orders found from 4/19/2018 to 4/22/2018.            Pending Results Instructions     If you had any lab results that were not finalized at the time of your Discharge, you can call the ED Lab Result RN at [phone]. You will be contacted by this team for any positive Lab results or changes in treatment. The nurses are available 7 days a week from 10A to 6:30P.  You can leave a message 24 hours per day and they will return your call.        Test Results From Your Hospital Stay               Thank you for choosing [facility]       Thank you for choosing [facility] for your care. Our goal is always to provide you with excellent care. Hearing back from our patients is one way we can continue to improve our services. Please take a few minutes to complete the written survey that you may receive in the mail after you visit with us. Thank you!        [employer] Information     [employer] lets you send messages to your doctor, view your test results, renew your prescriptions, schedule appointments and more. To sign up, go to [email or web address] . Click on \"Log in\" on the left side of the screen, which will take you to the Welcome page. Then click on \"Sign up Now\" on the right side of the page.     You will be asked to enter the access code listed below, as well as some personal information. Please follow the directions to create your username and password.     Your access code is: [email or web address]  Expires: 5/22/2018  8:19 AM     Your access code will "  in 90 days. If you need help or a new code, please call your [place] clinic or [phone].        Care EveryWhere ID     This is your Care EveryWhere ID. This could be used by other organizations to access your [place] medical records  [id number]        Equal Access to Services     SARAH BETH ESCOBEDO : Ritesh Pereyra, waaxda luqadaha, qaybta kaalmanorma sinclair, philippe rutledge. So [facility] [phone].    ATENCIÓN: Si habla español, tiene a ward disposición servicios gratuitos de asistencia lingüística. Llame al [phone].    We comply with applicable federal civil rights laws and [place] laws. We do not discriminate on the basis of race, color, national origin, age, disability, sex, sexual orientation, or gender identity.            After Visit Summary       This is your record. Keep this with you and show to your community pharmacist(s) and doctor(s) at your next visit.

## 2018-08-03 ENCOUNTER — OFFICE VISIT (OUTPATIENT)
Dept: FAMILY MEDICINE | Facility: CLINIC | Age: 33
End: 2018-08-03
Payer: COMMERCIAL

## 2018-08-03 VITALS
OXYGEN SATURATION: 100 % | HEART RATE: 102 BPM | HEIGHT: 70 IN | DIASTOLIC BLOOD PRESSURE: 86 MMHG | SYSTOLIC BLOOD PRESSURE: 130 MMHG | TEMPERATURE: 98.3 F | BODY MASS INDEX: 45.1 KG/M2 | WEIGHT: 315 LBS

## 2018-08-03 DIAGNOSIS — I87.2 VENOUS STASIS DERMATITIS OF LEFT LOWER EXTREMITY: Primary | ICD-10-CM

## 2018-08-03 DIAGNOSIS — E66.01 MORBID OBESITY (H): ICD-10-CM

## 2018-08-03 PROCEDURE — 99213 OFFICE O/P EST LOW 20 MIN: CPT | Performed by: NURSE PRACTITIONER

## 2018-08-03 NOTE — MR AVS SNAPSHOT
"              After Visit Summary   8/3/2018    Micehl Bradshaw    MRN: [id number]           Patient Information     Date Of Birth          1985        Visit Information        Provider Department      8/3/2018 3:20 PM Deann Garcia APRN CNP [facility]        Today's Diagnoses     Venous stasis dermatitis of left lower extremity    -  1    Morbid obesity (H)           Follow-ups after your visit        Follow-up notes from your care team     Return if symptoms worsen or fail to improve.      Who to contact     If you have questions or need follow up information about today's clinic visit or your schedule please contact [facility] directly at [phone].  Normal or non-critical lab and imaging results will be communicated to you by MyChart, letter or phone within 4 business days after the clinic has received the results. If you do not hear from us within 7 days, please contact the clinic through MyChart or phone. If you have a critical or abnormal lab result, we will notify you by phone as soon as possible.  Submit refill requests through [employer] or call your pharmacy and they will forward the refill request to us. Please allow 3 business days for your refill to be completed.          Additional Information About Your Visit        Care EveryWhere ID     This is your Care EveryWhere ID. This could be used by other organizations to access your [facility] medical records  [id number]        Your Vitals Were     Pulse Temperature Height Pulse Oximetry BMI (Body Mass Index)       102 98.3  F (36.8  C) (Tympanic) 5' 10\" (1.778 m) 100% 52.11 kg/m2        Blood Pressure from Last 3 Encounters:   08/03/18 130/86   04/21/18 (!) 166/103   02/28/18 126/78    Weight from Last 3 Encounters:   08/03/18 (!) 363 lb 3.2 oz (164.7 kg)   02/21/18 (!) 367 lb (166.5 kg)   06/12/17 (!) 341 lb (154.7 kg)              Today, you had the following     No orders found for display       Primary Care " Provider Office Phone # Fax #    Margarette Liliane Lowery [phone] [address] [facility] [facility] [address]        Equal Access to Services     SARAH BETH ESCOBEDO : Hadfrandy rees mitzyo Soclaudeali, waaxda luqadaha, qaybta kaalmada dottie, philippe harp laMadhavalyssa rutledge. So [facility] [phone].    ATENCIÓN: Si habla español, tiene a ward disposición servicios gratuitos de asistencia lingüística. Llame al [phone].    We comply with applicable federal civil rights laws and [place] laws. We do not discriminate on the basis of race, color, national origin, age, disability, sex, sexual orientation, or gender identity.            Thank you!     Thank you for choosing [facility]  for your care. Our goal is always to provide you with excellent care. Hearing back from our patients is one way we can continue to improve our services. Please take a few minutes to complete the written survey that you may receive in the mail after your visit with us. Thank you!             Your Updated Medication List - Protect others around you: Learn how to safely use, store and throw away your medicines at www.disposemymeds.org.          This list is accurate as of 8/3/18 11:59 PM.  Always use your most recent med list.                   Brand Name Dispense Instructions for use Diagnosis    BENADRYL 25 MG capsule   Generic drug:  diphenhydrAMINE     56    1 CAPSULE EVERY 4 TO 6 HOURS AS NEEDED        JOBST FOR MEN KNEE HIGH/LG Misc     2 each    Dispense knee high 20-30 mm Hg jobst compression stocking    Venous stasis, Venous stasis dermatitis of left lower extremity       triamcinolone 0.1 % cream    KENALOG    45 g    Apply twice daily to rash on leg as needed.    Venous stasis dermatitis of left lower extremity

## 2018-08-03 NOTE — PROGRESS NOTES
"  SUBJECTIVE:   Michel Bradshaw is a 32 year old male who presents to clinic today for the following health issues: complains of discoloration on the front of his left leg, no pain, has some bilateral LE swelling, this is ongoing for over a year.     Problem list and histories reviewed & adjusted, as indicated.  Additional history: as documented    Labs reviewed in EPIC    Reviewed and updated as needed this visit by clinical staff  Tobacco  Allergies  Med Hx  Surg Hx  Fam Hx  Soc Hx      Reviewed and updated as needed this visit by Provider         ROS:  Constitutional, HEENT, cardiovascular, pulmonary, gi and gu systems are negative, except as otherwise noted.    OBJECTIVE:     /86  Pulse 102  Temp 98.3  F (36.8  C) (Tympanic)  Ht 5' 10\" (1.778 m)  Wt (!) 363 lb 3.2 oz (164.7 kg)  SpO2 100%  BMI 52.11 kg/m2  Body mass index is 52.11 kg/(m^2).  GENERAL: healthy, alert and no distress  CV: left anterior lower leg varicosities, stasis dermatitis and mild bilateral non-pitting LE edema   SKIN: left LE stasis dermatitis, no signs of infection    Diagnostic Test Results:  none     ASSESSMENT/PLAN:     1. Venous stasis dermatitis of left lower extremity  -educated patient about his condition, reassured that this is chronic discoloration of the skin due to superficial varicosis, obesity. Highly advised to loose weight, offered bariatric clinic consult, or dietitian consult, but patient declined  -advised to walk daily, exercise  -use compression stockings or ACE wraps  -monitor for signs of infection     2. Morbid obesity (H)  -discussed healthy diet, exercise, offered bariatric clinic referral, or dietitian consult, but patient not interested currently.       See Patient Instructions    SHARAD Siu [facility]  "

## 2019-02-21 NOTE — PROGRESS NOTES
a  SUBJECTIVE:                                                    Michel Bradshaw is 33 year old male   Chief Complaint   Patient presents with       Set up for physical but changed his mind when I came in exam room, just wants a few things checked.  Hit knee on gas nozzle at gas station and , right.  Left leg swollen and two sores.      Problem list and histories reviewed & adjusted, as indicated.  Additional history: swollen discolored leg and contusion to knee    Patient Active Problem List   Diagnosis     Other acute otitis externa     Acute otitis media     Morbid obesity (H)     History reviewed. No pertinent surgical history.    Social History     Tobacco Use     Smoking status: Never Smoker     Smokeless tobacco: Never Used   Substance Use Topics     Alcohol use: Yes     History reviewed. No pertinent family history.      Current Outpatient Medications   Medication Sig Dispense Refill     BENADRYL 25 MG OR CAPS 1 CAPSULE EVERY 4 TO 6 HOURS AS NEEDED 56 0     lisinopril (PRINIVIL/ZESTRIL) 2.5 MG tablet Take 1 tablet (2.5 mg) by mouth daily 30 tablet 3     Elastic Bandages & Supports (JOBST FOR MEN KNEE HIGH/LG) MISC Dispense knee high 20-30 mm Hg jobst compression stocking (Patient not taking: Reported on 8/3/2018) 2 each 1     triamcinolone (KENALOG) 0.1 % cream Apply twice daily to rash on leg as needed. (Patient not taking: Reported on 8/3/2018) 45 g 1     No Known Allergies  No lab results found.   BP Readings from Last 3 Encounters:   02/22/19 (!) 128/92   08/03/18 130/86   04/21/18 (!) 166/103    Wt Readings from Last 3 Encounters:   02/22/19 (!) 165.3 kg (364 lb 6.4 oz)   08/03/18 (!) 164.7 kg (363 lb 3.2 oz)   02/21/18 (!) 166.5 kg (367 lb)         ROS:  Constitutional, HEENT, cardiovascular, pulmonary, gi and gu systems are negative, except as otherwise noted.    OBJECTIVE:                                                    BP (!) 128/92 (BP Location: Left arm, Patient Position: Chair,  "Cuff Size: Adult Large)   Pulse 106   Temp 98.3  F (36.8  C) (Tympanic)   Ht 1.791 m (5' 10.5\")   Wt (!) 165.3 kg (364 lb 6.4 oz)   SpO2 97%   BMI 51.55 kg/m    GENERAL APPEARANCE ADULT: Alert, no acute distress, morbidly obese  MS: leg left larger than right with pigmentation.  Right knee cap tender.  SKIN: lesion present, type:ulcerated, scaly, hyperkeratotic, appearance:light brown, location: left anterior leg, size: 1 cm  NEURO: Alert, oriented, speech and mentation normal, difficult speech, social shy  PSYCH: mentation appears normal., affect and mood normal  Diagnostic Test Results:  none      ASSESSMENT/PLAN:                                                    1. Localized edema  Left leg with two ulcers.  Need for compression stockings and weight loss  - LYMPHEDEMA THERAPY REFERRAL; Future    2. Venous stasis ulcer of other part of left lower leg limited to breakdown of skin without varicose veins (H)  - LYMPHEDEMA THERAPY REFERRAL; Future    3. Morbid obesity (H)  Ideal weight for height is 180, need to lose atleast 100 lbs  - BARIATRIC ADULT REFERRAL; Future    4. Essential hypertension  Recheck in 3 weeks to see blood pressure change  - Basic metabolic panel  - TSH  - T4 FREE  - lisinopril (PRINIVIL/ZESTRIL) 2.5 MG tablet; Take 1 tablet (2.5 mg) by mouth daily  Dispense: 30 tablet; Refill: 3    Margarette Lowery MD  [facility]        "

## 2019-02-22 ENCOUNTER — OFFICE VISIT (OUTPATIENT)
Dept: FAMILY MEDICINE | Facility: CLINIC | Age: 34
End: 2019-02-22
Payer: COMMERCIAL

## 2019-02-22 VITALS
HEART RATE: 106 BPM | OXYGEN SATURATION: 97 % | WEIGHT: 315 LBS | DIASTOLIC BLOOD PRESSURE: 92 MMHG | HEIGHT: 71 IN | SYSTOLIC BLOOD PRESSURE: 128 MMHG | BODY MASS INDEX: 44.1 KG/M2 | TEMPERATURE: 98.3 F

## 2019-02-22 DIAGNOSIS — R60.0 LOCALIZED EDEMA: Primary | ICD-10-CM

## 2019-02-22 DIAGNOSIS — E66.01 MORBID OBESITY (H): ICD-10-CM

## 2019-02-22 DIAGNOSIS — L97.821 VENOUS STASIS ULCER OF OTHER PART OF LEFT LOWER LEG LIMITED TO BREAKDOWN OF SKIN WITHOUT VARICOSE VEINS (H): ICD-10-CM

## 2019-02-22 DIAGNOSIS — Z23 NEED FOR VACCINATION: ICD-10-CM

## 2019-02-22 DIAGNOSIS — I10 ESSENTIAL HYPERTENSION: ICD-10-CM

## 2019-02-22 DIAGNOSIS — I87.2 VENOUS STASIS ULCER OF OTHER PART OF LEFT LOWER LEG LIMITED TO BREAKDOWN OF SKIN WITHOUT VARICOSE VEINS (H): ICD-10-CM

## 2019-02-22 LAB
ANION GAP SERPL CALCULATED.3IONS-SCNC: 6 MMOL/L (ref 3–14)
BUN SERPL-MCNC: 15 MG/DL (ref 7–30)
CALCIUM SERPL-MCNC: 6.5 MG/DL (ref 8.5–10.1)
CHLORIDE SERPL-SCNC: 99 MMOL/L (ref 94–109)
CO2 SERPL-SCNC: 31 MMOL/L (ref 20–32)
CREAT SERPL-MCNC: 0.93 MG/DL (ref 0.66–1.25)
GFR SERPL CREATININE-BSD FRML MDRD: >90 ML/MIN/{1.73_M2}
GLUCOSE SERPL-MCNC: 129 MG/DL (ref 70–99)
POTASSIUM SERPL-SCNC: 3.5 MMOL/L (ref 3.4–5.3)
SODIUM SERPL-SCNC: 136 MMOL/L (ref 133–144)
T4 FREE SERPL-MCNC: 0.87 NG/DL (ref 0.76–1.46)
TSH SERPL DL<=0.005 MIU/L-ACNC: 4.3 MU/L (ref 0.4–4)

## 2019-02-22 PROCEDURE — 36415 COLL VENOUS BLD VENIPUNCTURE: CPT | Performed by: FAMILY MEDICINE

## 2019-02-22 PROCEDURE — 90715 TDAP VACCINE 7 YRS/> IM: CPT | Performed by: FAMILY MEDICINE

## 2019-02-22 PROCEDURE — 80048 BASIC METABOLIC PNL TOTAL CA: CPT | Performed by: FAMILY MEDICINE

## 2019-02-22 PROCEDURE — 90471 IMMUNIZATION ADMIN: CPT | Performed by: FAMILY MEDICINE

## 2019-02-22 PROCEDURE — 99214 OFFICE O/P EST MOD 30 MIN: CPT | Performed by: FAMILY MEDICINE

## 2019-02-22 PROCEDURE — 84443 ASSAY THYROID STIM HORMONE: CPT | Performed by: FAMILY MEDICINE

## 2019-02-22 PROCEDURE — 84439 ASSAY OF FREE THYROXINE: CPT | Performed by: FAMILY MEDICINE

## 2019-02-22 RX ORDER — LISINOPRIL 2.5 MG/1
2.5 TABLET ORAL DAILY
Qty: 30 TABLET | Refills: 3 | Status: SHIPPED | OUTPATIENT
Start: 2019-02-22 | End: 2020-06-26

## 2019-02-22 ASSESSMENT — MIFFLIN-ST. JEOR: SCORE: 2607.1

## 2019-02-22 NOTE — LETTER
February 25, 2019      Michel Bradshaw  [address] [facility] [address]        Dear ,    We are writing to inform you of your test results.  Glucose and TSH elevated, not normal. Plan Please schedule an office visit to discuss options for treatment.    Resulted Orders   Basic metabolic panel   Result Value Ref Range    Sodium 136 133 - 144 mmol/L    Potassium 3.5 3.4 - 5.3 mmol/L    Chloride 99 94 - 109 mmol/L    Carbon Dioxide 31 20 - 32 mmol/L    Anion Gap 6 3 - 14 mmol/L    Glucose 129 (H) 70 - 99 mg/dL    Urea Nitrogen 15 7 - 30 mg/dL    Creatinine 0.93 0.66 - 1.25 mg/dL    GFR Estimate >90 >60 mL/min/[1.73_m2]      Comment:      Non  GFR Calc  Starting 12/18/2018, serum creatinine based estimated GFR (eGFR) will be   calculated using the Chronic Kidney Disease Epidemiology Collaboration   (CKD-EPI) equation.      GFR Estimate If Black >90 >60 mL/min/[1.73_m2]      Comment:       GFR Calc  Starting 12/18/2018, serum creatinine based estimated GFR (eGFR) will be   calculated using the Chronic Kidney Disease Epidemiology Collaboration   (CKD-EPI) equation.      Calcium 6.5 (L) 8.5 - 10.1 mg/dL   TSH   Result Value Ref Range    TSH 4.30 (H) 0.40 - 4.00 mU/L   T4 FREE   Result Value Ref Range    T4 Free 0.87 0.76 - 1.46 ng/dL       If you have any questions or concerns, please call the clinic at the number listed above.       Sincerely,        Margarette Lowery MD/bmc

## 2019-02-22 NOTE — NURSING NOTE
Screening Questionnaire for Adult Immunization    Are you sick today?   No   Do you have allergies to medications, food, a vaccine component or latex?   No   Have you ever had a serious reaction after receiving a vaccination?   No   Do you have a long-term health problem with heart disease, lung disease, asthma, kidney disease, metabolic disease (e.g. diabetes), anemia, or other blood disorder?   No   Do you have cancer, leukemia, HIV/AIDS, or any other immune system problem?   No   In the past 3 months, have you taken medications that affect  your immune system, such as prednisone, other steroids, or anticancer drugs; drugs for the treatment of rheumatoid arthritis, Crohn s disease, or psoriasis; or have you had radiation treatments?   No   Have you had a seizure, or a brain or other nervous system problem?   No   During the past year, have you received a transfusion of blood or blood     products, or been given immune (gamma) globulin or antiviral drug?   No   For women: Are you pregnant or is there a chance you could become        pregnant during the next month?   No   Have you received any vaccinations in the past 4 weeks?   No     Immunization questionnaire answers were all negative.        Per orders of Dr. Lowery, injection of Adacel given by Stephanie Jacobo. Patient instructed to remain in clinic for 15 minutes afterwards, and to report any adverse reaction to me immediately.       Screening performed by Stephanie Jacobo on 2/22/2019 at 1:16 PM.

## 2019-02-23 NOTE — RESULT ENCOUNTER NOTE
Glucose and TSH elevated, not normal. Plan see provider for treatment.  Please notify.        Thank you. ONEIL VELASQUEZ MD

## 2019-02-25 PROBLEM — I10 ESSENTIAL HYPERTENSION: Status: ACTIVE | Noted: 2019-02-25

## 2019-03-06 ENCOUNTER — HOSPITAL ENCOUNTER (OUTPATIENT)
Dept: PHYSICAL THERAPY | Facility: CLINIC | Age: 34
Setting detail: THERAPIES SERIES
End: 2019-03-06
Attending: FAMILY MEDICINE
Payer: COMMERCIAL

## 2019-03-06 PROCEDURE — 97140 MANUAL THERAPY 1/> REGIONS: CPT | Mod: GP | Performed by: PHYSICAL THERAPIST

## 2019-03-06 PROCEDURE — 97161 PT EVAL LOW COMPLEX 20 MIN: CPT | Mod: GP | Performed by: PHYSICAL THERAPIST

## 2019-03-06 NOTE — PROGRESS NOTES
"Outpatient Lymphedema Therapy Evaluation     03/06/19 0700   Rehab Discipline   Discipline PT   Type of Visit   Type of visit Initial Edema Evaluation       present No   General Information   Start of care 03/06/19   Referring physician Dr. Sebastián MD   Orders Evaluate and treat as indicated   Order date 02/22/19   Medical diagnosis LLE secondary lymphedema with recent venous stasis ulcers x2   Edema onset (edema ~ 1 year or so and wounds pt can't remember)   Affected body parts LLE   Edema etiology Ulcers  (recently healed, still fragile skin covering)   Edema etiology comments LLE secondary lymphedema with venous stasis ulcers x2 (recently healed, thin/fragile skin covering); morbid obestiy; history of LLE cellulitis ~2 years ago   Pertinent history of current problem (PT: include personal factors and/or comorbidities that impact the POC; OT: include additional occupational profile info) pt reports he hasn't tried anything for swelling or wounds; pt reports he's been wearing \"And1\" socks that have a little compression vs normal sock   Surgical / medical history reviewed Yes   Edema special tests (denies history of DVTs)   Prior level of functional mobility independent   Prior treatment (none)   Patient role / employment history Employed  (primarily standing)   Living environment comments lives with parents; pt reports he rather just come to clinic for appts and not have to rely on parents to help   Assistive device comments none   Fall Risk Screen   Fall screen completed by PT   Have you fallen 2 or more times in the past year? No   Have you fallen and had an injury in the past year? No   Is patient a fall risk? No   System Outcome Measures   Outcome Measures Lymphedema   Lymphedema Life Impact Scale (score range 0-72). A higher score indicates greater impairment. 5   Subjective Report   Patient report of symptoms denies any pain and very little tenderness   Precautions   Precautions comments " no known precautions   Patient / Family Goals   Patient / family goals statement to heal the wounds and lose weight   Pain   Patient currently in pain No   Vitals Signs   Heart Rate 97   SpO2 98   Cognitive Status   Orientation Orientation to person, place and time   Level of consciousness Alert   Follows commands and answers questions 100% of the time   Personal safety and judgement Intact   Memory Intact   Edema Exam / Assessment   Skin condition Pitting;Dryness;Hemosiderin deposits;Intact   Skin condition comments LLE skin all intact with two ulcers/wounds that are now healed over with thin still fragile skin, chronic hemosiderian staining present from ankle to upper calf with multiple visible veins, some varicose; 2+ pitting present frmo foot to knee, skin slightly dry   Scar No   Capillary refill Symmetrical   Dorsal pedal pulse Symmetrical   Stemmer sign Negative   Ulceration No   Girth Measurements   Girth Measurements Refer to separate girth measurement flowsheet   Volume LE   Right LE (mL) 3873.42   Left LE (mL) 4616.58   LE volume comparison LLE volume greater than RLE volume   % difference 16.1%   Range of Motion   ROM comments BLEs WFL   Strength   Strength comments BLEs WFL   Posture   Posture Normal   Palpation   Palpation denies   Activities of Daily Living   Activities of Daily Living independent    Sensory   Sensory perception Light touch   Light touch Intact   Vascular Assessment   Vascular Assessment Vascular concerns   Vascular Assessment Comments venous stasis ulcers on LLE   Coordination   Coordination Gross motor coordination appropriate   Muscle Tone   Muscle tone No deficits were identified   Planned Edema Interventions   Planned edema interventions Gradient compression bandaging;Fit for compression garment;Exercises;Precautions to prevent infection / exacerbation;Education;Manual therapy;Skin care / precautions;Home management program development   Clinical Impression   Criteria for skilled  therapeutic intervention met Yes   Therapy diagnosis LLE secondary lymphedema with recent venous stasis ulcers at risk for re-opening with current LLE edema   Influenced by the following impairments / conditions Stage 2;Phlebolymphedema;Wounds / Ulcers   Functional limitations due to impairments / conditions snugger fitting socks, shoes and pants on LLE vs RLE   Clinical Presentation Stable/Uncomplicated   Clinical Presentation Rationale clinical judgement; ulcers have healed though still fragile   Clinical Decision Making (Complexity) Low complexity   Treatment frequency 3 times / week   Treatment duration 8 weeks   Patient / family and/or staff in agreement with plan of care Yes   Risks and benefits of therapy have been explained Yes   Education Assessment   Preferred learning style Listening   Barriers to learning No barriers   Goals   Edema Eval Goals 1;2;3;4;5   Goal 1   Goal identifier stg   Goal description pt to have around the clock tolerance fo LLE GCB for edema reduction and wound healing response   Target date 03/20/19   Goal 2   Goal identifier ltg   Goal description once appropriate, pt to be independent with donning, doffing and care of compression garment for longterm edema management for maintenance   Target date 05/05/19   Goal 3   Goal identifier ltg   Goal description pt to be independent with longterm edema management via HEP, skin cares, elevation and compression garment wear   Target date 05/05/19   Total Evaluation Time   PT Eval, Low Complexity Minutes (90773) 10

## 2019-03-08 ENCOUNTER — HOSPITAL ENCOUNTER (OUTPATIENT)
Dept: PHYSICAL THERAPY | Facility: CLINIC | Age: 34
Setting detail: THERAPIES SERIES
End: 2019-03-08
Attending: FAMILY MEDICINE
Payer: COMMERCIAL

## 2019-03-08 PROCEDURE — 97140 MANUAL THERAPY 1/> REGIONS: CPT | Mod: GP | Performed by: REHABILITATION PRACTITIONER

## 2019-03-11 ENCOUNTER — TELEPHONE (OUTPATIENT)
Dept: PHYSICAL THERAPY | Facility: CLINIC | Age: 34
End: 2019-03-11

## 2019-03-11 ENCOUNTER — HOSPITAL ENCOUNTER (OUTPATIENT)
Dept: PHYSICAL THERAPY | Facility: CLINIC | Age: 34
Setting detail: THERAPIES SERIES
End: 2019-03-11
Attending: FAMILY MEDICINE
Payer: COMMERCIAL

## 2019-03-11 DIAGNOSIS — R60.9 EDEMA, UNSPECIFIED TYPE: Primary | ICD-10-CM

## 2019-03-11 PROCEDURE — 97140 MANUAL THERAPY 1/> REGIONS: CPT | Mod: GP | Performed by: REHABILITATION PRACTITIONER

## 2019-03-11 NOTE — TELEPHONE ENCOUNTER
Dr. Lowery I will be placing a DME order in Epic for signature please sign at your convenience. Thank you

## 2019-03-13 ENCOUNTER — HOSPITAL ENCOUNTER (OUTPATIENT)
Dept: PHYSICAL THERAPY | Facility: CLINIC | Age: 34
Setting detail: THERAPIES SERIES
End: 2019-03-13
Attending: FAMILY MEDICINE
Payer: COMMERCIAL

## 2019-03-13 PROCEDURE — 97140 MANUAL THERAPY 1/> REGIONS: CPT | Mod: GP | Performed by: REHABILITATION PRACTITIONER

## 2019-03-15 ENCOUNTER — HOSPITAL ENCOUNTER (OUTPATIENT)
Dept: PHYSICAL THERAPY | Facility: CLINIC | Age: 34
Setting detail: THERAPIES SERIES
End: 2019-03-15
Attending: FAMILY MEDICINE
Payer: COMMERCIAL

## 2019-03-15 PROCEDURE — 97140 MANUAL THERAPY 1/> REGIONS: CPT | Mod: GP | Performed by: REHABILITATION PRACTITIONER

## 2019-03-18 ENCOUNTER — HOSPITAL ENCOUNTER (OUTPATIENT)
Dept: PHYSICAL THERAPY | Facility: CLINIC | Age: 34
Setting detail: THERAPIES SERIES
End: 2019-03-18
Attending: FAMILY MEDICINE
Payer: COMMERCIAL

## 2019-03-18 PROCEDURE — 97140 MANUAL THERAPY 1/> REGIONS: CPT | Mod: GP | Performed by: REHABILITATION PRACTITIONER

## 2019-03-20 ENCOUNTER — HOSPITAL ENCOUNTER (OUTPATIENT)
Dept: PHYSICAL THERAPY | Facility: CLINIC | Age: 34
Setting detail: THERAPIES SERIES
End: 2019-03-20
Attending: FAMILY MEDICINE
Payer: COMMERCIAL

## 2019-03-20 PROCEDURE — 97140 MANUAL THERAPY 1/> REGIONS: CPT | Mod: GP | Performed by: REHABILITATION PRACTITIONER

## 2019-03-25 ENCOUNTER — HOSPITAL ENCOUNTER (OUTPATIENT)
Dept: PHYSICAL THERAPY | Facility: CLINIC | Age: 34
Setting detail: THERAPIES SERIES
End: 2019-03-25
Attending: FAMILY MEDICINE
Payer: COMMERCIAL

## 2019-03-25 PROCEDURE — 97140 MANUAL THERAPY 1/> REGIONS: CPT | Mod: GP | Performed by: REHABILITATION PRACTITIONER

## 2019-04-03 NOTE — ADDENDUM NOTE
Encounter addended by: Sadie Albert, PT on: 4/3/2019 2:16 PM   Actions taken: Sign clinical note, Flowsheet accepted

## 2019-04-03 NOTE — PROGRESS NOTES
Outpatient Physical Therapy Progress Note     Patient: Michel Bradshaw  : 1985    Beginning/End Dates of Reporting Period:  3/6/2019 to 4/3/2019    Referring Provider: Dr. Sebastián MD    Therapy Diagnosis: LLE secondary lymphedema with wounds (now 100% healed)     Client Self Report: no complaints with garments, no concerns with donning of garments, did have question if garments are to come off at night which pt thought so and removed at bedtime    Objective Measurements:  Objective Measure: girth  Details: L LE -0.7% since last treatment session and -14.7% since initial evaluation      Outcome Measures (most recent score):   LLIS = 5 on date of initial evaluation; pt will again complete LLIS at time of discharge     Goals:  Goal Identifier stg   Goal Description pt to have around the clock tolerance fo LLE GCB for edema reduction and wound healing response   Target Date 19   Date Met  19   Progress:     Goal Identifier ltg   Goal Description once appropriate, pt to be independent with donning, doffing and care of compression garment for longterm edema management for maintenance   Target Date 19   Date Met      Progress:     Goal Identifier ltg   Goal Description pt to be independent with longterm edema management via HEP, skin cares, elevation and compression garment wear   Target Date 19   Date Met      Progress:     Progress Toward Goals:   Patient has made good progress toward goals.  Patient is wearing BLE compression garments for maintenance and anticipate a few additional sessions to ensure that current compression garments and wear schedule are adequate in achieving longterm edema management for maintenance.    Plan:  Continue therapy per current plan of care.    Discharge:  No

## 2019-07-17 ENCOUNTER — OFFICE VISIT (OUTPATIENT)
Dept: FAMILY MEDICINE | Facility: CLINIC | Age: 34
End: 2019-07-17
Payer: COMMERCIAL

## 2019-07-17 VITALS
HEIGHT: 71 IN | WEIGHT: 315 LBS | RESPIRATION RATE: 16 BRPM | BODY MASS INDEX: 44.1 KG/M2 | HEART RATE: 101 BPM | DIASTOLIC BLOOD PRESSURE: 92 MMHG | TEMPERATURE: 97.8 F | OXYGEN SATURATION: 96 % | SYSTOLIC BLOOD PRESSURE: 128 MMHG

## 2019-07-17 DIAGNOSIS — I87.2 VENOUS STASIS DERMATITIS OF LEFT LOWER EXTREMITY: Primary | ICD-10-CM

## 2019-07-17 DIAGNOSIS — L03.116 CELLULITIS OF LEFT LOWER EXTREMITY: ICD-10-CM

## 2019-07-17 PROCEDURE — 99213 OFFICE O/P EST LOW 20 MIN: CPT | Performed by: NURSE PRACTITIONER

## 2019-07-17 RX ORDER — TRIAMCINOLONE ACETONIDE 1 MG/G
CREAM TOPICAL 2 TIMES DAILY
Qty: 80 G | Refills: 2 | Status: SHIPPED | OUTPATIENT
Start: 2019-07-17 | End: 2020-06-26

## 2019-07-17 RX ORDER — CEPHALEXIN 500 MG/1
500 CAPSULE ORAL 4 TIMES DAILY
Qty: 40 CAPSULE | Refills: 0 | Status: SHIPPED | OUTPATIENT
Start: 2019-07-17 | End: 2019-07-27

## 2019-07-17 ASSESSMENT — MIFFLIN-ST. JEOR: SCORE: 2593.49

## 2019-07-17 NOTE — PATIENT INSTRUCTIONS
Apply Kenalog cream twice daily for up to 2 weeks   Start Keflex 500 mg 4 times daily for 10 days     Can use Lanolin cream in the future for leg skin dryness    Continue with compression ACE wraps    Keep leg elevated    Try to walk daily

## 2019-07-17 NOTE — PROGRESS NOTES
"Subjective     Michel Bradshaw is a 33 year old male who presents to clinic today for the following health issues:    Chief Complaint   Patient presents with     RECHECK     Is here to follow up on Venous stasis dermatitis of left lower extremity, was going to the lympthedema clinic, leg has been swelling again, has been wearing compression stockings        HPI     Reviewed and updated as needed this visit by Provider  Tobacco  Allergies  Meds  Problems  Med Hx  Surg Hx  Fam Hx         Review of Systems   ROS COMP: Constitutional, HEENT, cardiovascular, pulmonary, gi and gu systems are negative, except as otherwise noted.      Objective    BP (!) 128/92 (BP Location: Left arm, Patient Position: Sitting, Cuff Size: Adult Regular)   Pulse 101   Temp 97.8  F (36.6  C) (Tympanic)   Resp 16   Ht 5' 10.5\" (1.791 m)   Wt (!) 361 lb 6.4 oz (163.9 kg)   SpO2 96%   BMI 51.12 kg/m    Body mass index is 51.12 kg/m .  Physical Exam   GENERAL: healthy, alert and no distress  CV: bilateral non-pitting leg edema, L>R  SKIN: left lower leg anterior erythema extending from the ankle up to the knee, feels warm to tough, tender to palpation.     PSYCH: mentation appears normal, affect normal/bright    Diagnostic Test Results:  Labs reviewed in Epic        Assessment & Plan     1. Venous stasis dermatitis of left lower extremity    - triamcinolone (KENALOG) 0.1 % external cream; Apply topically 2 times daily  Dispense: 80 g; Refill: 2  -keep leg elevated when possible  -recommended daily walking  -work on weight loss  -can also use Lanolin cream when symptoms get better     2. Cellulitis of left lower extremity    - cephALEXin (KEFLEX) 500 MG capsule; Take 1 capsule (500 mg) by mouth 4 times daily for 10 days  Dispense: 40 capsule; Refill: 0         See Patient Instructions    Return in about 10 days (around 7/27/2019), or if symptoms worsen or fail to improve.    SHARAD Siu [facility] - " FAMILY PRACTICE

## 2019-07-25 ENCOUNTER — TELEPHONE (OUTPATIENT)
Dept: FAMILY MEDICINE | Facility: CLINIC | Age: 34
End: 2019-07-25

## 2019-07-25 NOTE — TELEPHONE ENCOUNTER
Panel Management Review      Summary:    Patient is due/failing the following:   BP CHECK    Action needed:   Patient needs nurse only appointment.    Type of outreach:    Sent letter.    Questions for provider review:    None                                                                                                                                    Aria CORONEL CMA       Chart routed to None .

## 2019-07-25 NOTE — LETTER
July 25, 2019      Michel Bradshaw  [address] [place] [address]        Dear Michel,         At [facility] we care about your health and are committed to providing quality patient care, which includes staying current on preventative cancer screenings.  You can increase your chances of finding and treating cancers through regular screenings.      Our records show that you are due for the following screening(s):    Blood Pressure Recheck - [phone] to schedule a nurse only visit  During a recent visit to our clinic, your blood pressure was elevated above the target range for your health.   BP Readings from Last 3 Encounters:   07/17/19 (!) 128/92   02/22/19 (!) 128/92   08/03/18 130/86       Please schedule a free appointment for a blood pressure check with nurse in the near future.  You can call [phone] to do this.      Why is high blood pressure a big deal?    If blood pressure is elevated above target levels you have an increased risk of experiencing a heart attack or stroke, developing heart failure, kidney disease or other chronic diseases.    With appropriate early recognition and treatment, these health problems can be avoided in most cases.  Your physician can help you determine the need for treatment and discuss the non-medication and medication routes to treating high blood pressure as well as evaluate you for possible causes and effects of high blood pressure.    The target range for ideal blood pressure control is less than 140/90 for most individuals.  For those who have been diagnosed with heart disease, diabetes, stroke or peripheral vascular disease this target range is less than 130/80.      If you have already had one or all of the above screening tests at another facility, please call us so that we may update your chart.      Your partners in health,      Quality Committee   [facility]

## 2020-06-24 ENCOUNTER — TELEPHONE (OUTPATIENT)
Dept: FAMILY MEDICINE | Facility: CLINIC | Age: 35
End: 2020-06-24

## 2020-06-24 DIAGNOSIS — E66.01 MORBID OBESITY (H): ICD-10-CM

## 2020-06-24 DIAGNOSIS — R60.0 LOCALIZED EDEMA: Primary | ICD-10-CM

## 2020-06-24 NOTE — TELEPHONE ENCOUNTER
DME for compression stockings ready.  He has been getting these for years.    CSS - please fax to [facility].    Iris BRIGGS RN BSN

## 2020-06-24 NOTE — TELEPHONE ENCOUNTER
Reason for Call: Request for an order or referral:    Order or referral being requested: Pt asking that a DME order for compression stockings be sent to [facility] in [facility].  Pt is making an appt to see new PCP, but would like this order today if possible.  Please call patient and advise.      Date needed: at your convenience    Has the patient been seen by the PCP for this problem? NO    Additional comments:     Phone number Patient can be reached at:  Home number on file [phone] (home)    Best Time:  any    Can we leave a detailed message on this number?  YES    Call taken on 6/24/2020 at 9:26 AM by Yael Tejada

## 2020-06-25 NOTE — PROGRESS NOTES
SUBJECTIVE:                                                    Michel Bradshaw is 34 year old male   Chief Complaint   Patient presents with     Leg Swelling     Pt here for a f/u on lymphedema.       Concern - f/u on lymphedema  Onset: ongoing for the past couple of years    Description:   Pt needs new order for wraps and compression stockings    Intensity: no discomfort    Progression of Symptoms:  same and constant    Accompanying Signs & Symptoms:  Some discoloration on left leg    Previous history of similar problem:   Pt has had history of cellulitis and lymphedema    Precipitating factors:   Worsened by: none    Alleviating factors:  Improved by: none    Therapies Tried and outcome: pt keeps wrapped during the day    Problem list and histories reviewed & adjusted, as indicated.  Additional history: as documented    Patient Active Problem List   Diagnosis     Other acute otitis externa     Acute otitis media     Morbid obesity (H)     Essential hypertension     History reviewed. No pertinent surgical history.    Social History     Tobacco Use     Smoking status: Never Smoker     Smokeless tobacco: Never Used   Substance Use Topics     Alcohol use: Yes     Comment: social     History reviewed. No pertinent family history.      Current Outpatient Medications   Medication Sig Dispense Refill     BENADRYL 25 MG OR CAPS 1 CAPSULE EVERY 4 TO 6 HOURS AS NEEDED 56 0     Elastic Bandages & Supports (JOBST FOR MEN KNEE HIGH/LG) MISC Dispense knee high 20-30 mm Hg jobst compression stocking 6 each 11     No Known Allergies  Recent Labs   Lab Test 02/22/19  1314   CR 0.93   GFRESTIMATED >90   GFRESTBLACK >90   POTASSIUM 3.5   TSH 4.30*      BP Readings from Last 3 Encounters:   06/26/20 (!) 144/96   07/17/19 (!) 128/92   02/22/19 (!) 128/92    Wt Readings from Last 3 Encounters:   06/26/20 (!) 170.9 kg (376 lb 11.2 oz)   07/17/19 (!) 163.9 kg (361 lb 6.4 oz)   02/22/19 (!) 165.3 kg (364 lb 6.4 oz)     "     ROS:  Constitutional, HEENT, cardiovascular, pulmonary, gi and gu systems are negative, except as otherwise noted.    OBJECTIVE:                                                    BP (!) 144/96   Pulse 97   Temp 98.7  F (37.1  C) (Tympanic)   Resp 16   Ht 1.784 m (5' 10.25\")   Wt (!) 170.9 kg (376 lb 11.2 oz)   SpO2 96%   BMI 53.67 kg/m    GENERAL APPEARANCE ADULT: Alert, no acute distress, morbidly obese  MS: black splint on left leg, with black compression stocking  SKIN: no suspicious lesions or rashes  PSYCH: mentation appears normal., affect and mood normal  Diagnostic Test Results:  none      ASSESSMENT/PLAN:                                                    1. Localized edema  Stable on current treatment, probably does not need to go to the clinic but if unable to get supplies will contact them  - LYMPHEDEMA THERAPY REFERRAL; Future    2. Venous stasis  due for review and refill, taking medication without difficulty  - Elastic Bandages & Supports (JOBST FOR MEN KNEE HIGH/LG) MISC; Dispense knee high 20-30 mm Hg jobst compression stocking  Dispense: 6 each; Refill: 11    3. Venous stasis dermatitis of left lower extremity  due for review and refill, taking medication without difficulty  - Elastic Bandages & Supports (JOBST FOR MEN KNEE HIGH/LG) MISC; Dispense knee high 20-30 mm Hg jobst compression stocking  Dispense: 6 each; Refill: 11    Margarette Lowery MD  [facility]    "

## 2020-06-26 ENCOUNTER — OFFICE VISIT (OUTPATIENT)
Dept: FAMILY MEDICINE | Facility: CLINIC | Age: 35
End: 2020-06-26
Payer: COMMERCIAL

## 2020-06-26 VITALS
RESPIRATION RATE: 16 BRPM | WEIGHT: 315 LBS | TEMPERATURE: 98.7 F | HEART RATE: 97 BPM | SYSTOLIC BLOOD PRESSURE: 144 MMHG | BODY MASS INDEX: 45.1 KG/M2 | OXYGEN SATURATION: 96 % | HEIGHT: 70 IN | DIASTOLIC BLOOD PRESSURE: 96 MMHG

## 2020-06-26 DIAGNOSIS — I87.8 VENOUS STASIS: ICD-10-CM

## 2020-06-26 DIAGNOSIS — R60.0 LOCALIZED EDEMA: Primary | ICD-10-CM

## 2020-06-26 DIAGNOSIS — I87.2 VENOUS STASIS DERMATITIS OF LEFT LOWER EXTREMITY: ICD-10-CM

## 2020-06-26 PROCEDURE — 99213 OFFICE O/P EST LOW 20 MIN: CPT | Performed by: FAMILY MEDICINE

## 2020-06-26 ASSESSMENT — MIFFLIN-ST. JEOR: SCORE: 2653.92

## 2020-06-26 NOTE — PATIENT INSTRUCTIONS
Ideal weight for height is 170, realistic weight 200 (over 176 lbs) .  Need to lose at least 10%, 37 pounds in 37 weeks.  First stop gaining!

## 2020-07-20 ENCOUNTER — HOSPITAL ENCOUNTER (OUTPATIENT)
Dept: PHYSICAL THERAPY | Facility: CLINIC | Age: 35
Setting detail: THERAPIES SERIES
End: 2020-07-20
Attending: FAMILY MEDICINE
Payer: COMMERCIAL

## 2020-07-20 DIAGNOSIS — R60.0 LOCALIZED EDEMA: ICD-10-CM

## 2020-07-20 PROCEDURE — 97535 SELF CARE MNGMENT TRAINING: CPT | Mod: GP | Performed by: PHYSICAL THERAPIST

## 2020-07-20 PROCEDURE — 97161 PT EVAL LOW COMPLEX 20 MIN: CPT | Mod: GP | Performed by: PHYSICAL THERAPIST

## 2020-07-20 NOTE — PROGRESS NOTES
"Outpatient Lymphedema Therapy Evaluation       07/20/20 0800   Rehab Discipline   Discipline PT   Type of Visit   Type of visit Initial Edema Evaluation       present No   General Information   Start of care 07/20/20   Referring physician Dr. Sebastián MD   Orders Evaluate and treat as indicated   Order date 07/20/20   Medical diagnosis LLE secondary lymphedema with history of venous stasis ulcers x2   Edema onset   (7/20/2020 - date of referral)   Affected body parts LLE   Edema etiology   (history of LLE cellulitis infections)   Edema etiology comments history of LLE secondary lymphedema with venous statsis ulcers; morbid obesity; dependency   Pertinent history of current problem (PT: include personal factors and/or comorbidities that impact the POC; OT: include additional occupational profile info) patient last seen at this clinic 3/6/2019 - 4/9/2019 and was discharged wearing [employer] Lite x-lg/tall, x1 with x2 extra prs of liners ; pt reports he just got a new pair of garments the other day, wanting to checkin with lymphedema therapist to \"make sure everything is still going ok\"; denies any cellulitis infections from when last seen   Surgical / medical history reviewed Yes   Prior level of functional mobility independent with mobility   Prior treatment Compression garments;Gradient compression bandaging;Other  (wound care)   Patient role / employment history Employed   [facility]   Living environment comments lives with his [employer]   Assistive device comments none   Fall Risk Screen   Fall screen completed by PT   Have you fallen 2 or more times in the past year? No   Have you fallen and had an injury in the past year? No   Is patient a fall risk? No   Abuse Screen (yes response referral indicated)   Feels Unsafe at Home or Work/School no   Feels Threatened by Someone no   Does Anyone Try to Keep You From Having Contact with Others or Doing Things Outside Your Home? " no   Physical Signs of Abuse Present no   System Outcome Measures   Outcome Measures Lymphedema   Lymphedema Life Impact Scale (score range 0-72). A higher score indicates greater impairment. 3   Subjective Report   Patient report of symptoms denies any issues/symptoms in LLE   Precautions   Precautions comments no known precautions   Patient / Family Goals   Patient / family goals statement to see if the leg is doing ok   Pain   Patient currently in pain No   Cognitive Status   Orientation Orientation to person, place and time   Level of consciousness Alert   Follows commands and answers questions 100% of the time   Personal safety and judgement Intact   Memory Intact   Edema Exam / Assessment   Skin condition Pitting;Intact;Other   Skin condition comments 1+ pitting present in LLE from foot to knee with a few scattered small scabs at superior portion of lower leg, mild hemosiderian staining otherwise skin intact   Scar No   Capillary refill Symmetrical   Dorsal pedal pulse Symmetrical   Stemmer sign Negative   Ulceration No   Girth Measurements   Girth Measurements Refer to separate girth measurement flowsheet   Volume LE   Left LE (mL) 4365.84   % difference +6.5% from when last seen and measured on 4/9/2019   Range of Motion   ROM No deficits were identified   Strength   Strength No deficits were identified   Posture   Posture Normal   Palpation   Palpation denies hypersensitivities   Sensory   Sensory perception Light touch   Light touch Intact   Vascular Assessment   Vascular Assessment Vascular concerns   Vascular Assessment Comments history of venous stasis ulcers   Coordination   Coordination Gross motor coordination appropriate   Muscle Tone   Muscle tone No deficits were identified   Planned Edema Interventions   Planned edema interventions Precautions to prevent infection / exacerbation;Skin care / precautions;Home management program development   Clinical Impression   Criteria for skilled therapeutic  intervention met Yes   Therapy diagnosis LLE secondary lymphedema with venous componeont; chronic   Influenced by the following impairments / conditions Stage 2;Phlebolymphedema   Functional limitations due to impairments / conditions denies current limitations   Clinical Presentation Stable/Uncomplicated   Clinical Presentation Rationale clinical judgement; no wounds/weeping; fairly stable edema since last seen   Clinical Decision Making (Complexity) Low complexity   Treatment Frequency Other (see comments)  (1x eval and treatment only today)   Treatment duration 1 day   Patient / family and/or staff in agreement with plan of care Yes   Risks and benefits of therapy have been explained Yes   Education Assessment   Preferred learning style Listening   Barriers to learning No barriers   Goals   Edema Eval Goals 1   Goal 1   Goal identifier 1   Goal description pt to be independent with home program for LLE longterm lymphedema management via elevation, skin cares and compression garment wear/use   Target date 07/20/20   Total Evaluation Time   PT Eval, Low Complexity Minutes (27762) 5

## 2020-07-20 NOTE — PROGRESS NOTES
Outpatient Physical Therapy Discharge Note     Patient: Michel Bradshaw  : 1985    Beginning/End Dates of Reporting Period:   to 2020    Referring Provider: Dr. Sebastián MD    Therapy Diagnosis: LLE secondary lymphedema with venous component     Client Self Report: I think the leg is doing ok, I wear the garment everyday, sometimes not on weekends    Objective Measurements:  Objective Measure: girth  Details: LLE +6.5% from when last seen and measured on 2019     Outcome Measures (most recent score):  Lymphedema Life Impact Scale (score range 0-72). A higher score indicates greater impairment.: 3    Goals:  Goal Identifier 1   Goal Description pt to be independent with home program for LLE longterm lymphedema management via elevation, skin cares and compression garment wear/use   Target Date 20   Date Met  20   Progress:     Progress Toward Goals:   Goals met      Plan:  Discharge from therapy.    Discharge:    Reason for Discharge: Patient has met all goals.    Equipment Issued: none, pt just got new garments ([employer] Lite x-lg/tall, from [facility])    Discharge Plan: Patient to continue home program.

## 2020-07-31 ENCOUNTER — VIRTUAL VISIT (OUTPATIENT)
Dept: FAMILY MEDICINE | Facility: OTHER | Age: 35
End: 2020-07-31

## 2020-07-31 NOTE — PROGRESS NOTES
"Date: 2020 10:12:25  Clinician: Angela Turpin  Clinician NPI: [id number]  Patient: Yunier Bradshaw  Patient : 1985  Patient Address: [address], [place] [address]  Patient Phone: [phone]  Visit Protocol: URI  Patient Summary:  Yunier is a 35 year old ( : 1985 ) male who initiated a Visit for COVID-19 (Coronavirus) evaluation and screening. When asked the question \"Please sign me up to receive news, health information and promotions. \", Yunier responded \"No\".    When asked when his symptoms started, Yunier reported that he does not have any symptoms.   He denies having recent facial or sinus surgery in the past 60 days and taking antibiotic medication in the past month.    Pertinent COVID-19 (Coronavirus) information  In the past 14 days, Yunier has not worked in a congregate living setting.   He does not work or volunteer as healthcare worker or a  and does not work or volunteer in a healthcare facility.   Yunier also has not lived in a congregate living setting in the past 14 days. He does not live with a healthcare worker.   Yunier has had a close contact with a laboratory-confirmed COVID-19 patient in the last 14 days. Additional information about contact with COVID-19 (Coronavirus) patient as reported by the patient (free text): was at a sports clip getting a haircut and a few days after the person tested positive for covid-19. we were both wearing masks she was wearing gloves as well. waited in car tell they were ready.   Pertinent medical history  uYnier does not need a return to work/school note.   Weight: 370 lbs   Yunier does not smoke or use smokeless tobacco.   Weight: 370 lbs    MEDICATIONS: No current medications, ALLERGIES: NKDA  Clinician Response:  Dear Yunier,   Based on your exposure to COVID-19 (coronavirus), we would like to test you for this virus.  1. Please call [phone] to schedule your visit. Explain that you were referred by [facility] to have a " COVID-19 test. Be ready to share your [facility] visit ID number.  The following will serve as your written order for this COVID Test, ordered by me, for the indication of suspected COVID [Z20.828]: The test will be ordered in [employer], our electronic health record, after you are scheduled. It will show as ordered and authorized by Johan Mcdonough MD.  Order: COVID-19 (coronavirus) PCR for ASYMPTOMATIC EXPOSURE testing from [facility].  If you know you have had close contact with someone who tested positive, you should be quarantined for 14 days after this exposure. You should stay in quarantine for the14 days even if the covid test is negative, the optimal time to test after exposure is 5-7 days from the exposure  Quarantine means   What should I do?  For safety, it's very important to follow these rules. Do this for 14 days after the date you were last exposed to the virus..  Stay home and away from others. Don't go to school or anywhere else. Generally quarantine means staying home for work but there are some exceptions to this. Please contact your workplace.   No hugging, kissing or shaking hands.  Don't let anyone visit.  Cover your mouth and nose with a mask, tissue or washcloth to avoid spreading germs.  Wash your hands and face often. Use soap and water.  What are the symptoms of COVID-19?  The most common symptoms are cough, fever and trouble breathing. Less common symptoms include headache, body aches, fatigue (feeling very tired), chills, sore throat, stuffy or runny nose, diarrhea (loose poop), loss of taste or smell, belly pain, and nausea or vomiting (feeling sick to your stomach or throwing up).  After 14 days, if you have still don't have symptoms, you likely don't have this virus.  If you develop symptoms, follow these guidelines.  If you're normally healthy: Please start another [facility] visit to report your symptoms. Go to [email or web address].  If you have a serious health problem (like cancer, heart failure, an organ  transplant or kidney disease): Call your specialty clinic. Let them know that you might have COVID-19.  2. When it's time for your COVID test:  Stay at least 6 feet away from others. (If someone will drive you to your test, stay in the backseat, as far away from the  as you can.)  Cover your mouth and nose with a mask, tissue or washcloth.  Go straight to the testing site. Don't make any stops on the way there or back.  Please note  Caregivers in these groups are at risk for severe illness due to COVID-19:  o People 65 years and older  o People who live in a nursing home or long-term care facility  o People with chronic disease (lung, heart, cancer, diabetes, kidney, liver, immunologic)  o People who have a weakened immune system, including those who:  Are in cancer treatment  Take medicine that weakens the immune system, such as corticosteroids  Had a bone marrow or organ transplant  Have an immune deficiency  Have poorly controlled HIV or AIDS  Are obese (body mass index of 40 or higher)  Smoke regularly  Where can I get more information?  [facility] -- About COVID-19: [email or web address]  CDC -- What to Do If You're Sick: www.cdc.gov/coronavirus/2019-ncov/about/steps-when-sick.html  CDC -- Ending Home Isolation: www.cdc.gov/coronavirus/2019-ncov/hcp/disposition-in-home-patients.html  [facility] -- Caring for Someone: www.cdc.gov/coronavirus/2019-ncov/if-you-are-sick/care-for-someone.html  [facility] -- Interim Guidance for Hospital Discharge to Home: www.health.[facility].[place]/diseases/coronavirus/hcp/hospdischarge.pdf  [facility] clinical trials (COVID-19 research studies): clinicalaffairs.[facility]-clinical-trials  Below are the COVID-19 hotlines at the [place] Department of Health ([facility]). Interpreters are available.  For health questions: Call 823-658-1792 or 1-631.501.7948 (7 a.m. to 7 p.m.)  For questions about schools and childcare: Call 020-155-2021 or 1-770.655.8370 (7 a.m. to 7  p.m.)    Diagnosis: Contact with and (suspected) exposure to other viral communicable diseases  Diagnosis ICD: Z20.828

## 2020-08-01 DIAGNOSIS — Z20.822 ENCOUNTER FOR LABORATORY TESTING FOR COVID-19 VIRUS: Primary | ICD-10-CM

## 2020-08-01 PROCEDURE — U0003 INFECTIOUS AGENT DETECTION BY NUCLEIC ACID (DNA OR RNA); SEVERE ACUTE RESPIRATORY SYNDROME CORONAVIRUS 2 (SARS-COV-2) (CORONAVIRUS DISEASE [COVID-19]), AMPLIFIED PROBE TECHNIQUE, MAKING USE OF HIGH THROUGHPUT TECHNOLOGIES AS DESCRIBED BY CMS-2020-01-R: HCPCS | Performed by: FAMILY MEDICINE

## 2020-08-02 LAB
SARS-COV-2 RNA SPEC QL NAA+PROBE: NOT DETECTED
SPECIMEN SOURCE: NORMAL

## 2020-11-16 ENCOUNTER — HEALTH MAINTENANCE LETTER (OUTPATIENT)
Age: 35
End: 2020-11-16

## 2021-04-14 ENCOUNTER — OFFICE VISIT (OUTPATIENT)
Dept: NURSING | Facility: CLINIC | Age: 36
End: 2021-04-14
Payer: COMMERCIAL

## 2021-04-14 PROCEDURE — 0001A PR COVID VAC PFIZER DIL RECON 30 MCG/0.3 ML IM: CPT

## 2021-04-14 PROCEDURE — 91300 PR COVID VAC PFIZER DIL RECON 30 MCG/0.3 ML IM: CPT

## 2021-05-05 ENCOUNTER — IMMUNIZATION (OUTPATIENT)
Dept: NURSING | Facility: CLINIC | Age: 36
End: 2021-05-05
Attending: INTERNAL MEDICINE
Payer: COMMERCIAL

## 2021-05-05 PROCEDURE — 91300 PR COVID VAC PFIZER DIL RECON 30 MCG/0.3 ML IM: CPT

## 2021-05-05 PROCEDURE — 0002A PR COVID VAC PFIZER DIL RECON 30 MCG/0.3 ML IM: CPT

## 2021-09-18 ENCOUNTER — HEALTH MAINTENANCE LETTER (OUTPATIENT)
Age: 36
End: 2021-09-18

## 2022-01-02 ENCOUNTER — HEALTH MAINTENANCE LETTER (OUTPATIENT)
Age: 37
End: 2022-01-02

## 2022-01-22 ENCOUNTER — LAB (OUTPATIENT)
Dept: FAMILY MEDICINE | Facility: CLINIC | Age: 37
End: 2022-01-22
Attending: FAMILY MEDICINE
Payer: COMMERCIAL

## 2022-01-22 DIAGNOSIS — Z20.822 CLOSE EXPOSURE TO 2019 NOVEL CORONAVIRUS: ICD-10-CM

## 2022-01-22 PROCEDURE — U0003 INFECTIOUS AGENT DETECTION BY NUCLEIC ACID (DNA OR RNA); SEVERE ACUTE RESPIRATORY SYNDROME CORONAVIRUS 2 (SARS-COV-2) (CORONAVIRUS DISEASE [COVID-19]), AMPLIFIED PROBE TECHNIQUE, MAKING USE OF HIGH THROUGHPUT TECHNOLOGIES AS DESCRIBED BY CMS-2020-01-R: HCPCS

## 2022-01-22 PROCEDURE — U0005 INFEC AGEN DETEC AMPLI PROBE: HCPCS

## 2022-01-23 LAB — SARS-COV-2 RNA RESP QL NAA+PROBE: NEGATIVE

## 2022-01-27 ENCOUNTER — LAB (OUTPATIENT)
Dept: LAB | Facility: CLINIC | Age: 37
End: 2022-01-27
Attending: FAMILY MEDICINE
Payer: COMMERCIAL

## 2022-01-27 DIAGNOSIS — Z20.822 CLOSE EXPOSURE TO 2019 NOVEL CORONAVIRUS: ICD-10-CM

## 2022-01-27 PROCEDURE — U0003 INFECTIOUS AGENT DETECTION BY NUCLEIC ACID (DNA OR RNA); SEVERE ACUTE RESPIRATORY SYNDROME CORONAVIRUS 2 (SARS-COV-2) (CORONAVIRUS DISEASE [COVID-19]), AMPLIFIED PROBE TECHNIQUE, MAKING USE OF HIGH THROUGHPUT TECHNOLOGIES AS DESCRIBED BY CMS-2020-01-R: HCPCS

## 2022-01-27 PROCEDURE — U0005 INFEC AGEN DETEC AMPLI PROBE: HCPCS

## 2022-01-28 ENCOUNTER — OFFICE VISIT (OUTPATIENT)
Dept: ALLERGY | Facility: CLINIC | Age: 37
End: 2022-01-28
Payer: COMMERCIAL

## 2022-01-28 VITALS
BODY MASS INDEX: 51.82 KG/M2 | DIASTOLIC BLOOD PRESSURE: 99 MMHG | OXYGEN SATURATION: 99 % | TEMPERATURE: 98.9 F | WEIGHT: 315 LBS | HEART RATE: 92 BPM | SYSTOLIC BLOOD PRESSURE: 154 MMHG

## 2022-01-28 DIAGNOSIS — H10.89 OTHER CONJUNCTIVITIS OF BOTH EYES: ICD-10-CM

## 2022-01-28 DIAGNOSIS — J30.0 CHRONIC VASOMOTOR RHINITIS: Primary | ICD-10-CM

## 2022-01-28 LAB — SARS-COV-2 RNA RESP QL NAA+PROBE: NEGATIVE

## 2022-01-28 PROCEDURE — 82785 ASSAY OF IGE: CPT | Performed by: ALLERGY & IMMUNOLOGY

## 2022-01-28 PROCEDURE — 36415 COLL VENOUS BLD VENIPUNCTURE: CPT | Performed by: ALLERGY & IMMUNOLOGY

## 2022-01-28 PROCEDURE — 99203 OFFICE O/P NEW LOW 30 MIN: CPT | Performed by: ALLERGY & IMMUNOLOGY

## 2022-01-28 PROCEDURE — 86003 ALLG SPEC IGE CRUDE XTRC EA: CPT | Performed by: ALLERGY & IMMUNOLOGY

## 2022-01-28 RX ORDER — FLUTICASONE PROPIONATE 50 MCG
1-2 SPRAY, SUSPENSION (ML) NASAL 2 TIMES DAILY PRN
Qty: 16 G | Refills: 3 | Status: SHIPPED | OUTPATIENT
Start: 2022-01-28 | End: 2022-03-14

## 2022-01-28 RX ORDER — AZELASTINE HYDROCHLORIDE 0.5 MG/ML
1 SOLUTION/ DROPS OPHTHALMIC 2 TIMES DAILY PRN
Qty: 6 ML | Refills: 3 | Status: SHIPPED | OUTPATIENT
Start: 2022-01-28 | End: 2022-03-14

## 2022-01-28 RX ORDER — CETIRIZINE HYDROCHLORIDE 10 MG/1
10 TABLET ORAL DAILY PRN
Qty: 30 TABLET | Refills: 3 | Status: SHIPPED | OUTPATIENT
Start: 2022-01-28 | End: 2022-03-14

## 2022-01-28 ASSESSMENT — ENCOUNTER SYMPTOMS
FATIGUE: 0
COUGH: 0
SHORTNESS OF BREATH: 0
WHEEZING: 0
ACTIVITY CHANGE: 0
CHEST TIGHTNESS: 0
FEVER: 0
SINUS PRESSURE: 0
RHINORRHEA: 1
FACIAL SWELLING: 0
EYE DISCHARGE: 0
EYE ITCHING: 0
EYE REDNESS: 0

## 2022-01-28 NOTE — PATIENT INSTRUCTIONS
-start Flonase 1-2 sprays/each nostril once a day. Point the tip of the nasal spray to the same side eye  Stop Benadryl.   -Take cetirizine 10 mg by mouth once daily as needed.    -Optivar 1 drop in each eye twice daily as needed.    Get the bloodwork done.      Allergy Staff Appt Hours Shot Hours Locations    Physician     Kishan Zapien MD       Support Staff     Marina RN     Dawn RN     Sam LPN     Yaya CMA    Tuesday:   [place] :  [place]: :         :  [facility] [place] [place] [address]  [place]        Thursday: :: :     [place]        Tuesday: : : : : :    [place]       Tues & Wed: : & Thurs: :       Fri: :           [facility]  [address] [place] [address]  Appt Line: [phone]      [facility]  [address] [place] [address]  Appt Line: [phone]    Pulmonary Function Scheduling:  [place]: [phone]  [place]: [phone]  [phone]     Important Scheduling Information (if recommended by provider):  Aspirin Desensitization: Appt will last 2 clinic days. Please call the Allergy RN line for your clinic to schedule. Discontinue antihistamines 7 days prior to the appointment.     Food Challenges: Appt will last 3-4 hours. Please call the Allergy RN line for your clinic to schedule. Discontinue antihistamines 7 days prior to the appointment.     Penicillin Testing: Appt will last 2-3 hours. Please call the Allergy RN line for your clinic to schedule. Discontinue antihistamines 7 days prior to the appointment.     Skin Testing: Appt will about 40 minutes. Call the appointment line for your clinic to schedule. Discontinue antihistamines 7 days prior to the appointment.     Thank you for trusting us with your Allergy, Asthma, and Immunology care. Please feel free to contact  us with any questions or concerns you may have.      [facility] [phone]

## 2022-01-28 NOTE — PROGRESS NOTES
SUBJECTIVE:                                                               Michel Bradshaw presents today to our Allergy Clinic at [facility] for a new patient visit.  He is a 36-year-old male with possible environmental allergies.  He states that he has had environmental/seasonal allergies, as long as he can remember himself.  In the past, they were in Spring, Summer, Fall.  Transition from Summer to Fall is worse.  Recently, the symptoms became perennial but seasonally exacerbated (Spring, Summer, and Fall).  Manifested by clear rhinorrhea, nasal itchiness, sneezing,  and itchy/watery eyes.   Diphenhydramine seems to be approximately 70% effective.  He has not tried any nasal sprays consistently.  He suspects that he is allergic to cats.  Exposure to dust, mowing grass, and raking leaves can worsen symptoms.  States that he was tested multiple years ago as a child.  His parents were offered allergen immunotherapy, but they declined it at that time.  He denies a history of ear tubes, sinus surgeries, tonsillectomy, or adenoidectomy.    Patient Active Problem List   Diagnosis     Other acute otitis externa     Acute otitis media     Morbid obesity (H)     Essential hypertension       History reviewed. No pertinent past medical history.   No data available        History reviewed. No pertinent surgical history.  Social History     Socioeconomic History     Marital status: Single     Spouse name: None     Number of children: None     Years of education: None     Highest education level: None   Occupational History     Employer: [employer]   Tobacco Use     Smoking status: Never Smoker     Smokeless tobacco: Never Used   Substance and Sexual Activity     Alcohol use: Yes     Comment: social     Drug use: Never     Sexual activity: Not Currently   Other Topics Concern     Parent/sibling w/ CABG, MI or angioplasty before 65F 55M? No   Social History Narrative    January 28, 2022     ENVIRONMENTAL HISTORY: [place] The home is heated with unknown. They do not have central air conditioning. The patient's bedroom is furnished with carpeting in bedroom and blinds.  No pets. There is no history of cockroach or mice infestation. There is/are 0 smokers in the house, roommate smokes outside.  The house does not have a damp basement.      Social Determinants of Health     Financial Resource Strain: Not on file   Food Insecurity: Not on file   Transportation Needs: Not on file   Physical Activity: Not on file   Stress: Not on file   Social Connections: Not on file   Intimate Partner Violence: Not on file   Housing Stability: Not on file           Review of Systems   Constitutional: Negative for activity change, fatigue and fever.   HENT: Positive for rhinorrhea and sneezing. Negative for congestion, dental problem, ear pain, facial swelling, nosebleeds, postnasal drip and sinus pressure.    Eyes: Negative for discharge, redness and itching.   Respiratory: Negative for cough, chest tightness, shortness of breath and wheezing.    Cardiovascular: Negative for chest pain.   Skin: Negative for rash.           Current Outpatient Medications:      azelastine (OPTIVAR) 0.05 % ophthalmic solution, Apply 1 drop to eye 2 times daily as needed (itchy/watery eyes), Disp: 6 mL, Rfl: 3     BENADRYL 25 MG OR CAPS, 1 CAPSULE EVERY 4 TO 6 HOURS AS NEEDED, Disp: 56, Rfl: 0     cetirizine (ZYRTEC) 10 MG tablet, Take 1 tablet (10 mg) by mouth daily as needed for allergies, Disp: 30 tablet, Rfl: 3     fluticasone (FLONASE) 50 MCG/ACT nasal spray, Spray 1-2 sprays into both nostrils 2 times daily as needed for rhinitis or allergies, Disp: 16 g, Rfl: 3     Elastic Bandages & Supports (JOBST FOR MEN KNEE HIGH/LG) MISC, Dispense knee high 20-30 mm Hg jobst compression stocking (Patient not taking: Reported on 1/28/2022), Disp: 6 each, Rfl: 11  Immunization History   Administered  Date(s) Administered     COVID-19,PF,Pfizer (12+ Yrs) 04/14/2021, 05/05/2021, 01/09/2022     DTAP (<7y) 1985, 02/04/1985, 05/06/1985     Flu, Unspecified 11/05/2015, 10/03/2018     Influenza Vaccine IM > 6 months Valent IIV4 (Alfuria,Fluzone) 10/20/2019     Influenza Vaccine, 6+MO IM (QUADRIVALENT W/PRESERVATIVES) 11/05/2015, 10/21/2020, 10/21/2021     Polio, Unspecified  1985, 02/05/1985     TDAP Vaccine (Adacel) 02/22/2019     No Known Allergies  OBJECTIVE:                                                                 BP (!) 154/99 (BP Location: Left arm, Patient Position: Sitting, Cuff Size: Adult Large)   Pulse 92   Temp 98.9  F (37.2  C) (Tympanic)   Wt (!) 165 kg (363 lb 12.1 oz)   SpO2 99%   BMI 51.82 kg/m          Physical Exam  Vitals and nursing note reviewed.   Constitutional:       General: He is not in acute distress.     Appearance: He is obese. He is not diaphoretic.   HENT:      Head: Normocephalic and atraumatic.      Right Ear: Tympanic membrane, ear canal and external ear normal.      Left Ear: Tympanic membrane, ear canal and external ear normal.      Nose: Mucosal edema present. No rhinorrhea.      Right Turbinates: Enlarged. Not swollen or pale.      Left Turbinates: Enlarged. Not swollen or pale.      Comments: Septal spurs bilaterally      Mouth/Throat:      Lips: Pink.      Mouth: Mucous membranes are moist.      Pharynx: Oropharynx is clear. No pharyngeal swelling, oropharyngeal exudate or posterior oropharyngeal erythema.   Eyes:      General:         Right eye: No discharge.         Left eye: No discharge.      Conjunctiva/sclera: Conjunctivae normal.   Cardiovascular:      Rate and Rhythm: Normal rate and regular rhythm.      Heart sounds: Normal heart sounds. No murmur heard.      Pulmonary:      Effort: Pulmonary effort is normal. No respiratory distress.      Breath sounds: Normal breath sounds and air entry. No stridor, decreased air movement or transmitted upper  airway sounds. No decreased breath sounds, wheezing, rhonchi or rales.   Musculoskeletal:         General: Normal range of motion.      Cervical back: Normal range of motion.   Neurological:      Mental Status: He is alert and oriented to person, place, and time.   Psychiatric:         Mood and Affect: Mood normal.         Behavior: Behavior normal.           ASSESSMENT/PLAN:    Chronic vasomotor rhinitis  Other conjunctivitis of both eyes  Unable to perform SPT for aeroallergens.  Michel prefers not to come back for the skin test.  -Ordered serum IgE for regional aeroallergen panel.  -Start intranasal fluticasone 1-2 sprays each nostril once daily.  -Stop diphenhydramine.  Start cetirizine 10 mg by mouth once daily as needed.  -Start Optivar 1 drop in each eye twice daily as needed.    - Allergen cat epithellium IgE  - Allergen dog epithelium IgE  - Allergen Neel grass IgE  - Allergen orchard grass IgE  - Allergen bharath IgE  - Allergen D farinae IgE  - Allergen D pteronyssinus IgE  - Allergen alternaria alternata IgE  - Allergen aspergillus fumigatus IgE  - Allergen cladosporium herbarum IgE  - Allergen Epicoccum purpurascens IgE  - Allergen penicillium notatum IgE  - Allergen soledad white IgE  - Allergen Cedar IgE  - Allergen cottonwood IgE  - Allergen elm IgE  - Allergen maple box elder IgE  - Allergen oak white IgE  - Allergen Red [place] IgE  - Allergen silver  birch IgE  - Allergen Tree White [place] IgE  - Allergen Pierce Tree  - Allergen white pine IgE  - Allergen English plantain IgE  - Allergen giant ragweed IgE  - Allergen lamb's quarter IgE  - Allergen Mugwort IgE  - Allergen ragweed short IgE  - Allergen Sagebrush Wormwood IgE  - Allergen Sheep Sorrel IgE  - Allergen thistle Russian IgE  - Allergen Weed Nettle IgE  - Allergen, Kochia/Firebush  - IgE  - fluticasone (FLONASE) 50 MCG/ACT nasal spray  Dispense: 16 g; Refill: 3  - azelastine (OPTIVAR) 0.05 % ophthalmic solution  Dispense: 6 mL; Refill:  3  - cetirizine (ZYRTEC) 10 MG tablet  Dispense: 30 tablet; Refill: 3      Return in about 6 weeks (around 3/11/2022), or if symptoms worsen or fail to improve.    Thank you for allowing us to participate in the care of this patient. Please feel free to contact us if there are any questions or concerns about the patient.    Disclaimer: This note consists of symbols derived from keyboarding, dictation and/or voice recognition software. As a result, there may be errors in the script that have gone undetected. Please consider this when interpreting information found in this chart.    Kishan Zapien MD, FAAAAI, FACAAI  Allergy, Asthma and Immunology     [facility]

## 2022-01-31 LAB
A ALTERNATA IGE QN: 0.23 KU(A)/L
A FUMIGATUS IGE QN: <0.1 KU(A)/L
C HERBARUM IGE QN: 0.17 KU(A)/L
CALIF WALNUT POLN IGE QN: 1.26 KU(A)/L
CAT DANDER IGG QN: 7.32 KU(A)/L
CEDAR IGE QN: 0.18 KU(A)/L
COCKSFOOT IGE QN: 48.5 KU(A)/L
COMMON RAGWEED IGE QN: 61.5 KU(A)/L
COTTONWOOD IGE QN: 1.6 KU(A)/L
D FARINAE IGE QN: 0.3 KU(A)/L
D PTERONYSS IGE QN: 0.73 KU(A)/L
DOG DANDER+EPITH IGE QN: 0.62 KU(A)/L
E PURPURASCENS IGE QN: 0.38 KU(A)/L
EAST WHITE PINE IGE QN: 0.15 KU(A)/L
ENGL PLANTAIN IGE QN: 1.42 KU(A)/L
FIREBUSH IGE QN: 0.15 KU(A)/L
GIANT RAGWEED IGE QN: 28.9 KU(A)/L
GOOSEFOOT IGE QN: 1.27 KU(A)/L
IGE SERPL-ACNC: 431 KU/L (ref 0–114)
JOHNSON GRASS IGE QN: 7.37 KU(A)/L
MAPLE IGE QN: 10.3 KU(A)/L
MUGWORT IGE QN: 17 KU(A)/L
NETTLE IGE QN: 1.07 KU(A)/L
P NOTATUM IGE QN: 0.15 KU(A)/L
RED MULBERRY IGE QN: 0.45 KU(A)/L
SALTWORT IGE QN: 1.53 KU(A)/L
SHEEP SORREL IGE QN: 0.94 KU(A)/L
SILVER BIRCH IGE QN: 0.37 KU(A)/L
TIMOTHY IGE QN: 37.5 KU(A)/L
WHITE ASH IGE QN: 1.53 KU(A)/L
WHITE ELM IGE QN: 1.75 KU(A)/L
WHITE MULBERRY IGE QN: 0.87 KU(A)/L
WHITE OAK IGE QN: 1.15 KU(A)/L
WORMWOOD IGE QN: 17.1 KU(A)/L

## 2022-02-01 NOTE — RESULT ENCOUNTER NOTE
MyChart message sent:     Elevated total serum IgE, which is not uncommon for the patients with allergic rhinitis, asthma, food allergies, and/or eczema.  Serum IgE for regional aeroallergen panel with sensitivity to cat, grass pollen, tree pollen, and weed pollen.  Mild sensitivity to dog, dust mites, and molds noted as well.  -Recommend avoidance measures.  -No changes in the treatment plan.  If symptoms persist despite medications and allergen avoidance, or if medications are not tolerated, allergen immunotherapy is recommended.

## 2022-03-03 ENCOUNTER — E-VISIT (OUTPATIENT)
Dept: FAMILY MEDICINE | Facility: CLINIC | Age: 37
End: 2022-03-03
Payer: COMMERCIAL

## 2022-03-03 DIAGNOSIS — M54.50 LOW BACK PAIN, UNSPECIFIED BACK PAIN LATERALITY, UNSPECIFIED CHRONICITY, UNSPECIFIED WHETHER SCIATICA PRESENT: Primary | ICD-10-CM

## 2022-03-03 PROCEDURE — 99207 PR NON-BILLABLE SERV PER CHARTING: CPT | Performed by: NURSE PRACTITIONER

## 2022-03-11 ENCOUNTER — OFFICE VISIT (OUTPATIENT)
Dept: ALLERGY | Facility: CLINIC | Age: 37
End: 2022-03-11
Payer: COMMERCIAL

## 2022-03-11 VITALS
HEART RATE: 90 BPM | TEMPERATURE: 98.1 F | BODY MASS INDEX: 51.79 KG/M2 | OXYGEN SATURATION: 99 % | DIASTOLIC BLOOD PRESSURE: 88 MMHG | WEIGHT: 315 LBS | SYSTOLIC BLOOD PRESSURE: 137 MMHG

## 2022-03-11 DIAGNOSIS — H10.13 ALLERGIC CONJUNCTIVITIS OF BOTH EYES: ICD-10-CM

## 2022-03-11 DIAGNOSIS — J30.89 ALLERGIC RHINITIS CAUSED BY MOLD: ICD-10-CM

## 2022-03-11 DIAGNOSIS — J30.1 SEASONAL ALLERGIC RHINITIS DUE TO POLLEN: Primary | ICD-10-CM

## 2022-03-11 DIAGNOSIS — J30.81 ALLERGIC RHINITIS DUE TO ANIMALS: ICD-10-CM

## 2022-03-11 DIAGNOSIS — J30.89 ALLERGIC RHINITIS DUE TO DUST MITE: ICD-10-CM

## 2022-03-11 PROCEDURE — 99213 OFFICE O/P EST LOW 20 MIN: CPT | Performed by: ALLERGY & IMMUNOLOGY

## 2022-03-11 ASSESSMENT — ENCOUNTER SYMPTOMS
ADENOPATHY: 0
FACIAL SWELLING: 0
CHEST TIGHTNESS: 0
EYE REDNESS: 0
SORE THROAT: 0
DIARRHEA: 0
COUGH: 0
VOMITING: 0
SINUS PRESSURE: 0
SINUS PAIN: 0
HEADACHES: 0
RHINORRHEA: 0
WHEEZING: 0
NAUSEA: 0
SHORTNESS OF BREATH: 0
EYE ITCHING: 0
EYE DISCHARGE: 0

## 2022-03-11 NOTE — PATIENT INSTRUCTIONS
Allergy Staff Appt Hours Shot Hours Locations    Physician     Kishan Zapien MD       Support Staff     Marina RN     Dawn RN     Sam LPN     Yaya CMA    Tuesday:   [place]: :         [facility]  [place] 7-3  [place]        Thursday: :        Friday: 7-12:20     [place]        Tuesday: : : :: :: :    [place]       Tues & Wed: :       Mon & Thurs: :       Fri: :           [facility]  [address] [place] [address]  Appt Line: [phone]      [facility]  [address] [place] [address]  Appt Line: [phone]    Pulmonary Function Scheduling:  [place]: [phone]  [place]: [phone]  [phone]     Important Scheduling Information (if recommended by provider):  Aspirin Desensitization: Appt will last 2 clinic days. Please call the Allergy RN line for your clinic to schedule. Discontinue antihistamines 7 days prior to the appointment.     Food Challenges: Appt will last 3-4 hours. Please call the Allergy RN line for your clinic to schedule. Discontinue antihistamines 7 days prior to the appointment.     Penicillin Testing: Appt will last 2-3 hours. Please call the Allergy RN line for your clinic to schedule. Discontinue antihistamines 7 days prior to the appointment.     Skin Testing: Appt will about 40 minutes. Call the appointment line for your clinic to schedule. Discontinue antihistamines 7 days prior to the appointment.     Thank you for trusting us with your Allergy, Asthma, and Immunology care. Please feel free to contact us with any questions or concerns you may have.      [employer] Prescription Assistance Program [phone]

## 2022-03-11 NOTE — LETTER
3/11/2022         RE: Michel Bradshaw  [address] [facility] [address]        Dear Colleague,    Thank you for referring your patient, Michel Bradshaw, to the [facility]. Please see a copy of my visit note below.    SUBJECTIVE:                                                                   Michel Bradshaw presents today to our Allergy Clinic at [facility] for a follow up visit. He is a 36 year old male with allergic rhinoconjunctivitis  Problem   Seasonal Allergic Rhinitis Due to Pollen    History of rhinoconjunctivitis for multiple years.  Perennial but seasonally exacerbated (Spring, Summer, and Fall) exacerbated symptoms.  Serum IgE for regional aeroallergen panel in January 2022 showed sensitivity to cat, grass pollen, tree pollen, and weed pollen.  Mild sensitivity to dog, dust mites, and molds.Elevated total serum IgE, which is not uncommon for the patients with allergic rhinitis, asthma, food allergies, and/or eczema.       The patient states that he has been using a combination of intranasal fluticasone 1-2 sprays in each nostril once daily, cetirizine 10 mg by mouth once daily as needed, and Optivar 1 drop in each eye twice daily as needed.  While his conjunctiva slightly erythematous bilaterally on today's exam, he denies any ocular symptoms.  Overall, he is satisfied with the current symptom control.      Patient Active Problem List   Diagnosis     Other acute otitis externa     Acute otitis media     Morbid obesity (H)     Essential hypertension     Seasonal allergic rhinitis due to pollen     Allergic rhinitis due to animals     Allergic rhinitis due to dust mite     Allergic conjunctivitis of both eyes       History reviewed. No pertinent past medical history.   No data available        History reviewed. No pertinent surgical history.  Social History     Socioeconomic History     Marital status: Single     Spouse name: None      Number of children: None     Years of education: None     Highest education level: None   Occupational History     Employer: [employer]   Tobacco Use     Smoking status: Never Smoker     Smokeless tobacco: Never Used   Substance and Sexual Activity     Alcohol use: Yes     Comment: social     Drug use: Never     Sexual activity: Not Currently   Other Topics Concern     Parent/sibling w/ CABG, MI or angioplasty before 65F 55M? No   Social History Narrative    March 11, 2022    ENVIRONMENTAL HISTORY: [place] The home is heated with unknown. They do not have central air conditioning. The patient's bedroom is furnished with carpeting in bedroom and blinds.  No pets. There is no history of cockroach or mice infestation. There is/are 0 smokers in the house, roommate smokes outside.  The house does not have a damp basement.      Social Determinants of Health     Financial Resource Strain: Not on file   Food Insecurity: Not on file   Transportation Needs: Not on file   Physical Activity: Not on file   Stress: Not on file   Social Connections: Not on file   Intimate Partner Violence: Not on file   Housing Stability: Not on file           Review of Systems   HENT: Negative for congestion, ear discharge, ear pain, facial swelling, postnasal drip, rhinorrhea, sinus pressure, sinus pain, sneezing and sore throat.    Eyes: Negative for discharge, redness and itching.   Respiratory: Negative for cough, chest tightness, shortness of breath and wheezing.    Cardiovascular: Negative for chest pain.   Gastrointestinal: Negative for diarrhea, nausea and vomiting.   Skin: Negative for rash.   Neurological: Negative for headaches.   Hematological: Negative for adenopathy.           Current Outpatient Medications:      azelastine (OPTIVAR) 0.05 % ophthalmic solution, Apply 1 drop to eye 2 times daily as needed (itchy/watery eyes), Disp: 6 mL, Rfl: 3     cetirizine (ZYRTEC) 10 MG  tablet, Take 1 tablet (10 mg) by mouth daily as needed for allergies, Disp: 30 tablet, Rfl: 3     fluticasone (FLONASE) 50 MCG/ACT nasal spray, Spray 1-2 sprays into both nostrils 2 times daily as needed for rhinitis or allergies, Disp: 16 g, Rfl: 3     BENADRYL 25 MG OR CAPS, 1 CAPSULE EVERY 4 TO 6 HOURS AS NEEDED (Patient not taking: No sig reported), Disp: 56, Rfl: 0     Elastic Bandages & Supports (JOBST FOR MEN KNEE HIGH/LG) MISC, Dispense knee high 20-30 mm Hg jobst compression stocking (Patient not taking: Reported on 1/28/2022), Disp: 6 each, Rfl: 11  Immunization History   Administered Date(s) Administered     COVID-19,PF,Pfizer (12+ Yrs) 04/14/2021, 05/05/2021, 01/09/2022     DTAP (<7y) 1984, 02/04/1985, 05/06/1985     Flu, Unspecified 11/05/2015, 10/03/2018     Influenza Vaccine IM > 6 months Valent IIV4 (Alfuria,Fluzone) 10/20/2019     Influenza Vaccine, 6+MO IM (QUADRIVALENT W/PRESERVATIVES) 11/05/2015, 10/21/2020, 10/21/2021     Polio, Unspecified  1984, 02/05/1985     TDAP Vaccine (Adacel) 02/22/2019     No Known Allergies  OBJECTIVE:                                                                 /88 (BP Location: Left arm, Patient Position: Sitting, Cuff Size: Adult Large)   Pulse 90   Temp 98.1  F (36.7  C) (Tympanic)   Wt (!) 164.9 kg (363 lb 8.6 oz)   SpO2 99%   BMI 51.79 kg/m          Physical Exam  Vitals and nursing note reviewed.   Constitutional:       General: He is not in acute distress.     Appearance: He is obese. He is not diaphoretic.   HENT:      Head: Normocephalic and atraumatic.      Right Ear: Tympanic membrane, ear canal and external ear normal.      Left Ear: Tympanic membrane, ear canal and external ear normal.      Nose: Mucosal edema present. No rhinorrhea.      Right Turbinates: Enlarged. Not swollen or pale.      Left Turbinates: Enlarged. Not swollen or pale.      Comments: Septal spurs bilaterally      Mouth/Throat:      Lips: Pink.      Mouth:  Mucous membranes are moist.      Pharynx: Oropharynx is clear. No pharyngeal swelling, oropharyngeal exudate or posterior oropharyngeal erythema.   Eyes:      General:         Right eye: No discharge.         Left eye: No discharge.      Comments: Erythematous conjunctiva bilaterally   Cardiovascular:      Rate and Rhythm: Normal rate and regular rhythm.      Heart sounds: Normal heart sounds. No murmur heard.  Pulmonary:      Effort: Pulmonary effort is normal. No respiratory distress.      Breath sounds: Normal breath sounds and air entry. No stridor, decreased air movement or transmitted upper airway sounds. No decreased breath sounds, wheezing, rhonchi or rales.   Neurological:      Mental Status: He is alert and oriented to person, place, and time.   Psychiatric:         Mood and Affect: Mood normal.         Behavior: Behavior normal.             ASSESSMENT/PLAN:    Seasonal allergic rhinitis due to pollen  Allergic rhinitis due to animals  Allergic rhinitis caused by mold  Allergic rhinitis due to dust mite  Allergic conjunctivitis of both eyes     Significantly improved with a combination of intranasal fluticasone 1-2 sprays in each nostril once daily, cetirizine 10 mg by mouth once daily, and Optivar eyedrops on as-needed basis.  -Continue the same regimen.  -I will see the patient back in 3 months.  If symptoms persist despite medications and allergen avoidance, or if medications are not tolerated, allergen immunotherapy is recommended.       - fluticasone (FLONASE) 50 MCG/ACT nasal spray  Dispense: 16 g; Refill: 3  - cetirizine (ZYRTEC) 10 MG tablet  Dispense: 30 tablet; Refill: 3  - azelastine (OPTIVAR) 0.05 % ophthalmic solution  Dispense: 6 mL; Refill: 3       Return in about 3 months (around 6/11/2022), or if symptoms worsen or fail to improve.    Thank you for allowing us to participate in the care of this patient. Please feel free to contact us if there are any questions or concerns about the  patient.    Disclaimer: This note consists of symbols derived from keyboarding, dictation and/or voice recognition software. As a result, there may be errors in the script that have gone undetected. Please consider this when interpreting information found in this chart.    Kishan Zapien MD, FAAAAI, FACAAI  Allergy, Asthma and Immunology     [facility]         Again, thank you for allowing me to participate in the care of your patient.        Sincerely,        Kishan Zapien MD

## 2022-03-11 NOTE — PROGRESS NOTES
SUBJECTIVE:                                                                   Michel Bradshaw presents today to our Allergy Clinic at [facility] for a follow up visit. He is a 36 year old male with allergic rhinoconjunctivitis  Problem   Seasonal Allergic Rhinitis Due to Pollen    History of rhinoconjunctivitis for multiple years.  Perennial but seasonally exacerbated (Spring, Summer, and Fall) exacerbated symptoms.  Serum IgE for regional aeroallergen panel in January 2022 showed sensitivity to cat, grass pollen, tree pollen, and weed pollen.  Mild sensitivity to dog, dust mites, and molds.Elevated total serum IgE, which is not uncommon for the patients with allergic rhinitis, asthma, food allergies, and/or eczema.       The patient states that he has been using a combination of intranasal fluticasone 1-2 sprays in each nostril once daily, cetirizine 10 mg by mouth once daily as needed, and Optivar 1 drop in each eye twice daily as needed.  While his conjunctiva slightly erythematous bilaterally on today's exam, he denies any ocular symptoms.  Overall, he is satisfied with the current symptom control.      Patient Active Problem List   Diagnosis     Other acute otitis externa     Acute otitis media     Morbid obesity (H)     Essential hypertension     Seasonal allergic rhinitis due to pollen     Allergic rhinitis due to animals     Allergic rhinitis due to dust mite     Allergic conjunctivitis of both eyes       History reviewed. No pertinent past medical history.   No data available        History reviewed. No pertinent surgical history.  Social History     Socioeconomic History     Marital status: Single     Spouse name: None     Number of children: None     Years of education: None     Highest education level: None   Occupational History     Employer: [employer]   Tobacco Use     Smoking status: Never Smoker     Smokeless tobacco: Never Used   Substance and Sexual Activity      Alcohol use: Yes     Comment: social     Drug use: Never     Sexual activity: Not Currently   Other Topics Concern     Parent/sibling w/ CABG, MI or angioplasty before 65F 55M? No   Social History Narrative    March 11, 2022    ENVIRONMENTAL HISTORY: [place] The home is heated with unknown. They do not have central air conditioning. The patient's bedroom is furnished with carpeting in bedroom and blinds.  No pets. There is no history of cockroach or mice infestation. There is/are 0 smokers in the house, roommate smokes outside.  The house does not have a damp basement.      Social Determinants of Health     Financial Resource Strain: Not on file   Food Insecurity: Not on file   Transportation Needs: Not on file   Physical Activity: Not on file   Stress: Not on file   Social Connections: Not on file   Intimate Partner Violence: Not on file   Housing Stability: Not on file           Review of Systems   HENT: Negative for congestion, ear discharge, ear pain, facial swelling, postnasal drip, rhinorrhea, sinus pressure, sinus pain, sneezing and sore throat.    Eyes: Negative for discharge, redness and itching.   Respiratory: Negative for cough, chest tightness, shortness of breath and wheezing.    Cardiovascular: Negative for chest pain.   Gastrointestinal: Negative for diarrhea, nausea and vomiting.   Skin: Negative for rash.   Neurological: Negative for headaches.   Hematological: Negative for adenopathy.           Current Outpatient Medications:      azelastine (OPTIVAR) 0.05 % ophthalmic solution, Apply 1 drop to eye 2 times daily as needed (itchy/watery eyes), Disp: 6 mL, Rfl: 3     cetirizine (ZYRTEC) 10 MG tablet, Take 1 tablet (10 mg) by mouth daily as needed for allergies, Disp: 30 tablet, Rfl: 3     fluticasone (FLONASE) 50 MCG/ACT nasal spray, Spray 1-2 sprays into both nostrils 2 times daily as needed for rhinitis or allergies, Disp: 16 g, Rfl: 3      BENADRYL 25 MG OR CAPS, 1 CAPSULE EVERY 4 TO 6 HOURS AS NEEDED (Patient not taking: No sig reported), Disp: 56, Rfl: 0     Elastic Bandages & Supports (JOBST FOR MEN KNEE HIGH/LG) MISC, Dispense knee high 20-30 mm Hg jobst compression stocking (Patient not taking: Reported on 1/28/2022), Disp: 6 each, Rfl: 11  Immunization History   Administered Date(s) Administered     COVID-19,PF,Pfizer (12+ Yrs) 04/14/2021, 05/05/2021, 01/09/2022     DTAP (<7y) 1984, 02/04/1985, 05/06/1985     Flu, Unspecified 11/05/2015, 10/03/2018     Influenza Vaccine IM > 6 months Valent IIV4 (Alfuria,Fluzone) 10/20/2019     Influenza Vaccine, 6+MO IM (QUADRIVALENT W/PRESERVATIVES) 11/05/2015, 10/21/2020, 10/21/2021     Polio, Unspecified  1984, 02/05/1985     TDAP Vaccine (Adacel) 02/22/2019     No Known Allergies  OBJECTIVE:                                                                 /88 (BP Location: Left arm, Patient Position: Sitting, Cuff Size: Adult Large)   Pulse 90   Temp 98.1  F (36.7  C) (Tympanic)   Wt (!) 164.9 kg (363 lb 8.6 oz)   SpO2 99%   BMI 51.79 kg/m          Physical Exam  Vitals and nursing note reviewed.   Constitutional:       General: He is not in acute distress.     Appearance: He is obese. He is not diaphoretic.   HENT:      Head: Normocephalic and atraumatic.      Right Ear: Tympanic membrane, ear canal and external ear normal.      Left Ear: Tympanic membrane, ear canal and external ear normal.      Nose: Mucosal edema present. No rhinorrhea.      Right Turbinates: Enlarged. Not swollen or pale.      Left Turbinates: Enlarged. Not swollen or pale.      Comments: Septal spurs bilaterally      Mouth/Throat:      Lips: Pink.      Mouth: Mucous membranes are moist.      Pharynx: Oropharynx is clear. No pharyngeal swelling, oropharyngeal exudate or posterior oropharyngeal erythema.   Eyes:      General:         Right eye: No discharge.         Left eye: No discharge.      Comments:  Erythematous conjunctiva bilaterally   Cardiovascular:      Rate and Rhythm: Normal rate and regular rhythm.      Heart sounds: Normal heart sounds. No murmur heard.  Pulmonary:      Effort: Pulmonary effort is normal. No respiratory distress.      Breath sounds: Normal breath sounds and air entry. No stridor, decreased air movement or transmitted upper airway sounds. No decreased breath sounds, wheezing, rhonchi or rales.   Neurological:      Mental Status: He is alert and oriented to person, place, and time.   Psychiatric:         Mood and Affect: Mood normal.         Behavior: Behavior normal.             ASSESSMENT/PLAN:    Seasonal allergic rhinitis due to pollen  Allergic rhinitis due to animals  Allergic rhinitis caused by mold  Allergic rhinitis due to dust mite  Allergic conjunctivitis of both eyes     Significantly improved with a combination of intranasal fluticasone 1-2 sprays in each nostril once daily, cetirizine 10 mg by mouth once daily, and Optivar eyedrops on as-needed basis.  -Continue the same regimen.  -I will see the patient back in 3 months.  If symptoms persist despite medications and allergen avoidance, or if medications are not tolerated, allergen immunotherapy is recommended.       - fluticasone (FLONASE) 50 MCG/ACT nasal spray  Dispense: 16 g; Refill: 3  - cetirizine (ZYRTEC) 10 MG tablet  Dispense: 30 tablet; Refill: 3  - azelastine (OPTIVAR) 0.05 % ophthalmic solution  Dispense: 6 mL; Refill: 3       Return in about 3 months (around 6/11/2022), or if symptoms worsen or fail to improve.    Thank you for allowing us to participate in the care of this patient. Please feel free to contact us if there are any questions or concerns about the patient.    Disclaimer: This note consists of symbols derived from keyboarding, dictation and/or voice recognition software. As a result, there may be errors in the script that have gone undetected. Please consider this when interpreting information found  in this chart.    Kishan Zapien MD, FAAAAI, FACAAI  Allergy, Asthma and Immunology     [facility]

## 2022-03-14 PROBLEM — J30.89 ALLERGIC RHINITIS DUE TO DUST MITE: Status: ACTIVE | Noted: 2022-03-14

## 2022-03-14 PROBLEM — J30.1 SEASONAL ALLERGIC RHINITIS DUE TO POLLEN: Chronic | Status: ACTIVE | Noted: 2022-03-14

## 2022-03-14 PROBLEM — J30.1 SEASONAL ALLERGIC RHINITIS DUE TO POLLEN: Status: ACTIVE | Noted: 2022-03-14

## 2022-03-14 PROBLEM — H10.13 ALLERGIC CONJUNCTIVITIS OF BOTH EYES: Status: ACTIVE | Noted: 2022-03-14

## 2022-03-14 PROBLEM — J30.81 ALLERGIC RHINITIS DUE TO ANIMALS: Status: ACTIVE | Noted: 2022-03-14

## 2022-03-14 RX ORDER — FLUTICASONE PROPIONATE 50 MCG
1-2 SPRAY, SUSPENSION (ML) NASAL 2 TIMES DAILY PRN
Qty: 16 G | Refills: 3 | Status: SHIPPED | OUTPATIENT
Start: 2022-03-14 | End: 2024-08-14

## 2022-03-14 RX ORDER — AZELASTINE HYDROCHLORIDE 0.5 MG/ML
1 SOLUTION/ DROPS OPHTHALMIC 2 TIMES DAILY PRN
Qty: 6 ML | Refills: 3 | Status: SHIPPED | OUTPATIENT
Start: 2022-03-14 | End: 2024-08-14

## 2022-03-14 RX ORDER — CETIRIZINE HYDROCHLORIDE 10 MG/1
10 TABLET ORAL DAILY PRN
Qty: 30 TABLET | Refills: 3 | Status: SHIPPED | OUTPATIENT
Start: 2022-03-14 | End: 2024-08-14

## 2022-08-25 ENCOUNTER — TELEPHONE (OUTPATIENT)
Dept: FAMILY MEDICINE | Facility: CLINIC | Age: 37
End: 2022-08-25

## 2022-08-25 NOTE — TELEPHONE ENCOUNTER
Pt reports cyst on mid back that he has had before that is bothering him again. Scheduled appt    Johnny Duran RN

## 2022-08-26 ENCOUNTER — OFFICE VISIT (OUTPATIENT)
Dept: FAMILY MEDICINE | Facility: CLINIC | Age: 37
End: 2022-08-26
Payer: COMMERCIAL

## 2022-08-26 VITALS
WEIGHT: 315 LBS | SYSTOLIC BLOOD PRESSURE: 142 MMHG | RESPIRATION RATE: 20 BRPM | BODY MASS INDEX: 45.1 KG/M2 | DIASTOLIC BLOOD PRESSURE: 96 MMHG | OXYGEN SATURATION: 97 % | TEMPERATURE: 97.5 F | HEIGHT: 70 IN | HEART RATE: 109 BPM

## 2022-08-26 DIAGNOSIS — L03.818 CELLULITIS OF OTHER SPECIFIED SITE: Primary | ICD-10-CM

## 2022-08-26 DIAGNOSIS — Z13.220 SCREENING FOR HYPERLIPIDEMIA: ICD-10-CM

## 2022-08-26 DIAGNOSIS — I10 ESSENTIAL HYPERTENSION: ICD-10-CM

## 2022-08-26 PROCEDURE — 10060 I&D ABSCESS SIMPLE/SINGLE: CPT | Performed by: FAMILY MEDICINE

## 2022-08-26 PROCEDURE — 99214 OFFICE O/P EST MOD 30 MIN: CPT | Mod: 25 | Performed by: FAMILY MEDICINE

## 2022-08-26 RX ORDER — CEPHALEXIN 500 MG/1
500 CAPSULE ORAL 4 TIMES DAILY
Qty: 40 CAPSULE | Refills: 0 | Status: SHIPPED | OUTPATIENT
Start: 2022-08-26 | End: 2024-08-14

## 2022-08-26 RX ORDER — LISINOPRIL 2.5 MG/1
2.5 TABLET ORAL DAILY
Qty: 30 TABLET | Refills: 3 | Status: SHIPPED | OUTPATIENT
Start: 2022-08-26 | End: 2024-08-14

## 2022-08-26 ASSESSMENT — ENCOUNTER SYMPTOMS
PSYCHIATRIC NEGATIVE: 1
GASTROINTESTINAL NEGATIVE: 1
NEUROLOGICAL NEGATIVE: 1
RESPIRATORY NEGATIVE: 1
MUSCULOSKELETAL NEGATIVE: 1
HEMATOLOGIC/LYMPHATIC NEGATIVE: 1
CARDIOVASCULAR NEGATIVE: 1
ALLERGIC/IMMUNOLOGIC NEGATIVE: 1
ENDOCRINE NEGATIVE: 1
WOUND: 1
CONSTITUTIONAL NEGATIVE: 1

## 2022-08-26 ASSESSMENT — PAIN SCALES - GENERAL: PAINLEVEL: NO PAIN (1)

## 2022-08-26 NOTE — PROGRESS NOTES
Assessment & Plan     Cellulitis of other specified site  Antibiotics faxed. Recommend that patient return for gauze removal on Monday  - cephALEXin (KEFLEX) 500 MG capsule; Take 1 capsule (500 mg) by mouth 4 times daily    Screening for hyperlipidemia  Due for labs.  - Lipid panel reflex to direct LDL Non-fasting; Future    Essential hypertension  Blood pressure noted to be high. He is supposed to be on medication but has not taken his medications in awhile.   - lisinopril (ZESTRIL) 2.5 MG tablet; Take 1 tablet (2.5 mg) by mouth daily        FUTURE APPOINTMENTS:       - Follow-up visit in 3 days or sooner as needed.    Return in about 3 days (around 8/29/2022) for Follow up.    Trevor Matthews MD  [facility] 36 year [facility] accompanied by his self, presenting for the following health issues:    Patient is a 36 yr old male here for a cyst on his back. He has had this in the past and it was drained in the ER. He comes in as he noticed the cyst a few weeks ago and it has progressively increased in size. There is some discomfort. He denies any systemic symptoms.   Cyst (Cyst on the right middle of the back.  Had issues in April, 2018 where he was seen in the ER and the area drained./No current drainage. There has been some redness. /Pain started again in the past week.  Will get better then gets worse./)    Chief Complaint   Patient presents with     Cyst     Cyst on the right middle of the back.  Had issues in April, 2018 where he was seen in the ER and the area drained.  No current drainage. There has been some redness.   Pain started again in the past week.  Will get better then gets worse.         History of Present Illness       Back Pain:  He presents for follow up of back pain. Patient's back pain is a recurring problem.  Location of back pain:  Right lower back  Description of back pain: dull ache  Back pain spreads: nowhere    Since patient first noticed  "back pain, pain is: always present, but gets better and worse  Does back pain interfere with his job:  No      He eats 0-1 servings of fruits and vegetables daily.He consumes 2 sweetened beverage(s) daily.He exercises with enough effort to increase his heart rate 9 or less minutes per day.  He exercises with enough effort to increase his heart rate 4 days per week.   He is taking medications regularly.             Review of Systems   Constitutional: Negative.    HENT: Negative.    Respiratory: Negative.    Cardiovascular: Negative.    Gastrointestinal: Negative.    Endocrine: Negative.    Genitourinary: Negative.    Musculoskeletal: Negative.    Skin: Positive for wound.   Allergic/Immunologic: Negative.    Neurological: Negative.    Hematological: Negative.    Psychiatric/Behavioral: Negative.         Objective    BP (!) 142/96   Pulse 109   Temp 97.5  F (36.4  C) (Tympanic)   Resp 20   Ht 1.784 m (5' 10.25\")   Wt (!) 158.8 kg (350 lb)   SpO2 97%   BMI 49.86 kg/m    Body mass index is 49.86 kg/m .  Physical Exam  Constitutional:       Appearance: Normal appearance. He is obese.   HENT:      Head: Normocephalic and atraumatic.   Musculoskeletal:         General: Normal range of motion.   Skin:     General: Skin is warm.      Findings: Erythema present.             Comments: Large coin like cyst on the mid back. I and D performed and purulent discharge was expressed. Packed with iodoform gauze with close follow up on Monday.   Neurological:      Mental Status: He is alert and oriented to person, place, and time.   Psychiatric:         Mood and Affect: Mood normal.         Behavior: Behavior normal.                    .  ..  "

## 2022-08-26 NOTE — PROCEDURES
After obtaining consent, cyst was prepared in the usual fashion, lidocaine 1 % was infiltrated around the cyst. Using a size 11 blade a small insicion was made. Purulent discharge was expressed. Packed with iodoform gauze, dressing was placed on wound. Patient tolerated procedure well. Asked that patient return in 3 days to have gauze removed.

## 2022-08-29 ENCOUNTER — ALLIED HEALTH/NURSE VISIT (OUTPATIENT)
Dept: FAMILY MEDICINE | Facility: CLINIC | Age: 37
End: 2022-08-29
Payer: COMMERCIAL

## 2022-08-29 DIAGNOSIS — L72.3 SEBACEOUS CYST: Primary | ICD-10-CM

## 2022-08-29 PROCEDURE — 99207 PR NO CHARGE NURSE ONLY: CPT

## 2022-09-07 ENCOUNTER — OFFICE VISIT (OUTPATIENT)
Dept: FAMILY MEDICINE | Facility: CLINIC | Age: 37
End: 2022-09-07
Payer: COMMERCIAL

## 2022-09-07 VITALS
HEART RATE: 93 BPM | RESPIRATION RATE: 20 BRPM | HEIGHT: 70 IN | DIASTOLIC BLOOD PRESSURE: 86 MMHG | WEIGHT: 315 LBS | BODY MASS INDEX: 45.1 KG/M2 | SYSTOLIC BLOOD PRESSURE: 145 MMHG

## 2022-09-07 DIAGNOSIS — L08.9 INFECTED SEBACEOUS CYST: Primary | ICD-10-CM

## 2022-09-07 DIAGNOSIS — L72.3 INFECTED SEBACEOUS CYST: Primary | ICD-10-CM

## 2022-09-07 PROCEDURE — 99213 OFFICE O/P EST LOW 20 MIN: CPT | Performed by: FAMILY MEDICINE

## 2022-09-07 NOTE — PROGRESS NOTES
Assessment/ Plan     1. Infected sebaceous cyst  Yunier presents to follow-up on an infected sebaceous cyst he has on his back.  He saw another provider last week who performed an I&D and put him on oral antibiotics.  He is coming in for recheck as he cannot see it very well.  On exam, I did not was able to express a little bit of purulent and cheesy material, but overall looks like it is healing well.  There is no longer any redness or induration.  Would have him try to soak this area in a tub to facilitate drainage, change the bandage daily, and follow-up if worsening symptoms.  He does seem to be reacting to the adhesive so would have him switch over to gauze and paper tape.      Subjective:      Michel Bradshaw is a 37 year old male who presents for follow-up sebaceous cyst.  He was seen last week at the [facility].  The provider there performed an I&D and put him on Keflex.  He still has a couple days left of the antibiotics.  He states that just is itchy at this point he is not having any pain or discomfort.  Its hard for him to see.  His parents have not helping him change the dressing.    Relevant past medical, family, surgical, and social history reviewed with patient, unless noted in HPI, not pertinent for this visit.  Medications were discussed and reconciled.   Review of Systems   A 12 point comprehensive review of systems was negative except as noted.      Current Outpatient Medications   Medication Sig Dispense Refill     azelastine (OPTIVAR) 0.05 % ophthalmic solution Apply 1 drop to eye 2 times daily as needed (itchy/watery eyes) 6 mL 3     BENADRYL 25 MG OR CAPS 1 CAPSULE EVERY 4 TO 6 HOURS AS NEEDED 56 0     cephALEXin (KEFLEX) 500 MG capsule Take 1 capsule (500 mg) by mouth 4 times daily 40 capsule 0     cetirizine (ZYRTEC) 10 MG tablet Take 1 tablet (10 mg) by mouth daily as needed for allergies 30 tablet 3     Elastic Bandages & Supports (JOBST FOR MEN KNEE HIGH/LG) MISC Dispense knee  "high 20-30 mm Hg jobst compression stocking 6 each 11     fluticasone (FLONASE) 50 MCG/ACT nasal spray Spray 1-2 sprays into both nostrils 2 times daily as needed for rhinitis or allergies 16 g 3     lisinopril (ZESTRIL) 2.5 MG tablet Take 1 tablet (2.5 mg) by mouth daily 30 tablet 3         Objective:     BP (!) 145/86   Pulse 93   Resp 20   Ht 1.784 m (5' 10.25\")   Wt (!) 159.7 kg (352 lb)   BMI 50.15 kg/m      Body mass index is 50.15 kg/m .       General appearance: alert, appears stated age and cooperative     Skin exam: Upper mid back there is still a very small incision and a little bit of purulent material.  I was able to squeeze this and express a little bit of cheesy material.  There is no surrounding erythema or induration at this point.  We replaced a dressing.  He is having some breakdown from the adhesive of the Band-Aid.    No results found for this or any previous visit (from the past 168 hour(s)).       This note has been dictated using voice recognition software. Any grammatical or context distortions are unintentional and inherent to the software  Answers for HPI/ROS submitted by the patient on 8/26/2022  Your back pain is: recurring  Where is your back pain located? : right lower back  How would you describe your back pain? : dull ache  Where does your back pain spread? : nowhere  Since you noticed your back pain, how has it changed? : always present, but gets better and worse  Does your back pain interfere with your job?: No  How many servings of fruits and vegetables do you eat daily?: 0-1  On average, how many sweetened beverages do you drink each day (Examples: soda, juice, sweet tea, etc.  Do NOT count diet or artificially sweetened beverages)?: 2  How many minutes a day do you exercise enough to make your heart beat faster?: 9 or less  How many days a week do you exercise enough to make your heart beat faster?: 4  How many days per week do you miss taking your medication?: 0      "

## 2022-11-19 ENCOUNTER — HEALTH MAINTENANCE LETTER (OUTPATIENT)
Age: 37
End: 2022-11-19

## 2023-04-09 ENCOUNTER — HEALTH MAINTENANCE LETTER (OUTPATIENT)
Age: 38
End: 2023-04-09

## 2024-02-14 ENCOUNTER — ALLIED HEALTH/NURSE VISIT (OUTPATIENT)
Dept: FAMILY MEDICINE | Facility: CLINIC | Age: 39
End: 2024-02-14
Payer: COMMERCIAL

## 2024-02-14 ENCOUNTER — OFFICE VISIT (OUTPATIENT)
Dept: FAMILY MEDICINE | Facility: CLINIC | Age: 39
End: 2024-02-14
Payer: COMMERCIAL

## 2024-02-14 VITALS
DIASTOLIC BLOOD PRESSURE: 87 MMHG | WEIGHT: 315 LBS | HEART RATE: 102 BPM | OXYGEN SATURATION: 96 % | TEMPERATURE: 97.6 F | SYSTOLIC BLOOD PRESSURE: 163 MMHG | BODY MASS INDEX: 50.95 KG/M2

## 2024-02-14 DIAGNOSIS — L72.3 SEBACEOUS CYST: Primary | ICD-10-CM

## 2024-02-14 DIAGNOSIS — R23.8 SKIN PIMPLE: Primary | ICD-10-CM

## 2024-02-14 PROCEDURE — 10060 I&D ABSCESS SIMPLE/SINGLE: CPT

## 2024-02-14 PROCEDURE — 99207 PR NO CHARGE NURSE ONLY: CPT

## 2024-02-14 RX ORDER — SULFAMETHOXAZOLE/TRIMETHOPRIM 800-160 MG
1 TABLET ORAL 2 TIMES DAILY
Qty: 14 TABLET | Refills: 0 | Status: SHIPPED | OUTPATIENT
Start: 2024-02-14 | End: 2024-02-21

## 2024-02-14 NOTE — PROGRESS NOTES
"Patient walk-in with concern of a cyst-like lump. He had noticed this a few days ago as a pimple, which escalated to it becoming bigger and bleeding. Area is bleeding at a slow rate, patient is not on any blood thinners.    1. APPEARANCE of SWELLING: \"What does it look like?\"   Purple-red around the area, black in the middle.   2. SIZE: \"How large is the swelling?\" (e.g., inches, cm; or compare to size of pinhead, tip of pen, eraser, coin, pea, grape, ping pong ball)   Grape-ping pong ball.   3. LOCATION: \"Where is the swelling located?\"   Right lower abdomen.   4. ONSET: \"When did the swelling start?\"   Few days ago  5. COLOR: \"What color is it?\" \"Is there more than one color?\"   Red-purple  6. PAIN: \"Is there any pain?\" If Yes, ask: \"How bad is the pain?\" (e.g., scale 1-10; or mild, moderate, severe)      - NONE (0): no pain     - MILD (1-3): doesn't interfere with normal activities     - MODERATE (4-7): interferes with normal activities or awakens from sleep     - SEVERE (8-10): excruciating pain, unable to do any normal activities   1/10  7. ITCH: \"Does it itch?\" If Yes, ask: \"How bad is the itch?\"   Patient denies itch.   8. CAUSE: \"What do you think caused the swelling?\"   Pain  9 OTHER SYMPTOMS: \"Do you have any other symptoms?\" (e.g., fever)   Patient denies.     Swelling is red and size > 2 inches (5.0 cm)  R/O: cellulitis. Note: Cellulitis is painful to touch; itchiness suggests insect bite or local allergic reaction.    Yes Go To Office Now     Patient booked for provider appointment today at 8 am. RN secured gauze and large bandage while patient awaits appointment time. Antibacterial ointment not applied at this time to allow provider to be able to evaluate initial condition of lump.    JAMIE Curtis RN  [facility]  "

## 2024-02-14 NOTE — PROGRESS NOTES
Assessment & Plan     Sebaceous cyst  Right groin. I&D was completed. Guaze and tape were placed over the incision and patient was instructed to keep area clean and to change dressing daily. A follow up appointment was made in two days for evaluation. A prescription for Bactrim was sent to the pharmacy and instructed patient to follow up if he develops signs of infection.   - sulfamethoxazole-trimethoprim (BACTRIM DS) 800-160 MG tablet; Take 1 tablet by mouth 2 times daily for 7 days  - DRAIN SKIN ABSCESS SIMPLE/SINGLE                  Subjective   Yunier is a 38 year old, presenting for the following health issues:  Mass (Lump cyst like, bleeding)        2/14/2024     7:57 AM   Additional Questions   Roomed by doe Padron  This is a new problem. The current episode started in the past 7 days. The problem has been gradually worsening.   History of Present Illness       Reason for visit:  Potentionlly cyst bleeding    He eats 0-1 servings of fruits and vegetables daily.He consumes 2 sweetened beverage(s) daily.He exercises with enough effort to increase his heart rate 10 to 19 minutes per day.  He exercises with enough effort to increase his heart rate 3 or less days per week.   He is taking medications regularly.     Pt reports cyst like mass on rt lower abdomen- groin area.  Red and purple in color, minor bleeding, minor pain. Started bleeding last night. Had a cyst removed from his back a year ago.                   Objective    BP (!) 163/87 (BP Location: Right arm, Patient Position: Sitting, Cuff Size: Adult Large)   Pulse 102   Temp 97.6  F (36.4  C) (Oral)   Wt (!) 162.2 kg (357 lb 9.6 oz)   SpO2 96%   BMI 50.95 kg/m    Body mass index is 50.95 kg/m .  Physical Exam   GENERAL: alert and no distress  SKIN: Hard cyst with redness about 3cm felt under the skin near right groin. Small hole to the center of cyst with a scant amount of blood leaking from area.            Potential risks discussed and  include (but are not limited to) bleeding, infection, scarring.  Chlorhexidine at the site.  Sterile technique maintained.  3 cc of Lidocaine 1% with epinephrine was injected subcutaneously at the site.  A small incision (1.0cm) was made over cystic lesion. Minimal amount of blood and tissue was expelled from cyst. Hemostasis obtained and sterile dressing placed.  Patient tolerated the procedure well, with no complications.  Post-procedure care instructions were given to the patient.      Signed Electronically by: SHARAD Duff CNP

## 2024-02-16 ENCOUNTER — OFFICE VISIT (OUTPATIENT)
Dept: FAMILY MEDICINE | Facility: CLINIC | Age: 39
End: 2024-02-16
Payer: COMMERCIAL

## 2024-02-16 VITALS
DIASTOLIC BLOOD PRESSURE: 88 MMHG | OXYGEN SATURATION: 98 % | SYSTOLIC BLOOD PRESSURE: 142 MMHG | HEART RATE: 87 BPM | RESPIRATION RATE: 16 BRPM | TEMPERATURE: 97.4 F

## 2024-02-16 DIAGNOSIS — L72.3 SEBACEOUS CYST: Primary | ICD-10-CM

## 2024-02-16 PROCEDURE — 99207 PR BUNDLED PROCEDURE IN GLOBAL PKG: CPT

## 2024-02-16 NOTE — PROGRESS NOTES
Assessment & Plan     Sebaceous cyst  Improved, cyst size has decreased slightly and redness and disappeared completely. Plan for patient to continue antibiotic, dress daily, and use warm compress. Patient scheduled to follow up on Tuesday for continued surveillance. Instructed patient on when he should go to ER.               Diane Faye is a 38 year old, presenting for the following health issues:  Cyst        2/16/2024     6:49 AM   Additional Questions   Roomed by doe roberson     History of Present Illness       Reason for visit:  Potentionlly cyst bleeding    He eats 0-1 servings of fruits and vegetables daily.He consumes 2 sweetened beverage(s) daily.He exercises with enough effort to increase his heart rate 10 to 19 minutes per day.  He exercises with enough effort to increase his heart rate 3 or less days per week.   He is taking medications regularly.     Pt reports that incision has not fully closed yet and that there is a little bleeding.  No pain, pt reports no redness around cyst. Continues to take antibiotics and dresses daily.                    Objective    BP (!) 142/88 (BP Location: Right arm, Patient Position: Sitting, Cuff Size: Adult Large)   Pulse 87   Temp 97.4  F (36.3  C) (Temporal)   Resp 16   SpO2 98%   There is no height or weight on file to calculate BMI.  Physical Exam   GENERAL: alert and no distress  SKIN: 2.5cm of hardened area under skin. No redness or warmth to area. 1cm incision with a small amount of cyst wall removed from cyst. Small amount of bloody drainage coming from cyst opening.             Signed Electronically by: SHARAD Duff CNP

## 2024-02-20 ENCOUNTER — OFFICE VISIT (OUTPATIENT)
Dept: FAMILY MEDICINE | Facility: CLINIC | Age: 39
End: 2024-02-20
Payer: COMMERCIAL

## 2024-02-20 VITALS
HEART RATE: 95 BPM | DIASTOLIC BLOOD PRESSURE: 86 MMHG | SYSTOLIC BLOOD PRESSURE: 131 MMHG | RESPIRATION RATE: 20 BRPM | OXYGEN SATURATION: 97 % | TEMPERATURE: 97.7 F

## 2024-02-20 DIAGNOSIS — L72.3 SEBACEOUS CYST: Primary | ICD-10-CM

## 2024-02-20 PROCEDURE — 99024 POSTOP FOLLOW-UP VISIT: CPT

## 2024-02-20 NOTE — PROGRESS NOTES
Assessment & Plan     Sebaceous cyst  Improved, no redness or swelling noted. Incision almost completely healed. Patient continues to have a hardened area under the skin but this has improved about 1cm in size. Plan for patient to finish antibiotics later this week, use warm compress and keep bandage on until incision has healed. Patient encouraged to follow up if symptoms of infection develop and educated patient that if cyst returns, he may need a larger incision which he shows understanding.                   Diane Faye is a 38 year old, presenting for the following health issues:  RECHECK (cyst)      2/20/2024    10:43 AM   Additional Questions   Roomed by doe roberson     History of Present Illness       Reason for visit:  Potentionlly cyst bleeding    He eats 0-1 servings of fruits and vegetables daily.He consumes 2 sweetened beverage(s) daily.He exercises with enough effort to increase his heart rate 10 to 19 minutes per day.  He exercises with enough effort to increase his heart rate 3 or less days per week.   He is taking medications regularly.     Pt reports cyst is getting better and just a little bit of bleeding.  No pain.                   Objective    /86 (BP Location: Right arm, Patient Position: Sitting, Cuff Size: Adult Large)   Pulse 95   Temp 97.7  F (36.5  C) (Oral)   Resp 20   SpO2 97%   There is no height or weight on file to calculate BMI.  Physical Exam   GENERAL: alert and no distress  SKIN: right groin cyst. Incision noted but closed, no blood noted, no redness or warmth. 1cm of hardened area under skin felt but improved.             Signed Electronically by: SHARAD Duff CNP

## 2024-06-16 ENCOUNTER — HEALTH MAINTENANCE LETTER (OUTPATIENT)
Age: 39
End: 2024-06-16

## 2024-06-20 ENCOUNTER — OFFICE VISIT (OUTPATIENT)
Dept: FAMILY MEDICINE | Facility: CLINIC | Age: 39
End: 2024-06-20

## 2024-06-20 VITALS
BODY MASS INDEX: 47.16 KG/M2 | SYSTOLIC BLOOD PRESSURE: 148 MMHG | HEART RATE: 90 BPM | OXYGEN SATURATION: 97 % | TEMPERATURE: 98.5 F | WEIGHT: 315 LBS | DIASTOLIC BLOOD PRESSURE: 96 MMHG | RESPIRATION RATE: 16 BRPM

## 2024-06-20 DIAGNOSIS — E66.01 MORBID OBESITY (H): ICD-10-CM

## 2024-06-20 DIAGNOSIS — E11.69 TYPE 2 DIABETES MELLITUS WITH OTHER SPECIFIED COMPLICATION, WITHOUT LONG-TERM CURRENT USE OF INSULIN (H): ICD-10-CM

## 2024-06-20 DIAGNOSIS — R73.9 ELEVATED BLOOD SUGAR: ICD-10-CM

## 2024-06-20 DIAGNOSIS — I10 BENIGN ESSENTIAL HYPERTENSION: ICD-10-CM

## 2024-06-20 DIAGNOSIS — E78.5 HYPERLIPIDEMIA LDL GOAL <100: ICD-10-CM

## 2024-06-20 DIAGNOSIS — Z11.59 NEED FOR HEPATITIS C SCREENING TEST: Primary | ICD-10-CM

## 2024-06-20 PROBLEM — E11.9 DIABETES MELLITUS, TYPE 2 (H): Status: ACTIVE | Noted: 2024-06-20

## 2024-06-20 LAB
CREAT UR-MCNC: 40.4 MG/DL
ERYTHROCYTE [DISTWIDTH] IN BLOOD BY AUTOMATED COUNT: 13.2 % (ref 10–15)
HBA1C MFR BLD: 13.8 % (ref 0–5.6)
HCT VFR BLD AUTO: 43.7 % (ref 40–53)
HCV AB SERPL QL IA: NONREACTIVE
HGB BLD-MCNC: 15 G/DL (ref 13.3–17.7)
HIV 1+2 AB+HIV1 P24 AG SERPL QL IA: NONREACTIVE
MCH RBC QN AUTO: 27.5 PG (ref 26.5–33)
MCHC RBC AUTO-ENTMCNC: 34.3 G/DL (ref 31.5–36.5)
MCV RBC AUTO: 80 FL (ref 78–100)
MICROALBUMIN UR-MCNC: <12 MG/L
MICROALBUMIN/CREAT UR: NORMAL MG/G{CREAT}
PLATELET # BLD AUTO: 133 10E3/UL (ref 150–450)
RBC # BLD AUTO: 5.46 10E6/UL (ref 4.4–5.9)
WBC # BLD AUTO: 8.9 10E3/UL (ref 4–11)

## 2024-06-20 PROCEDURE — 80061 LIPID PANEL: CPT | Performed by: FAMILY MEDICINE

## 2024-06-20 PROCEDURE — 87389 HIV-1 AG W/HIV-1&-2 AB AG IA: CPT | Performed by: FAMILY MEDICINE

## 2024-06-20 PROCEDURE — G2211 COMPLEX E/M VISIT ADD ON: HCPCS | Performed by: FAMILY MEDICINE

## 2024-06-20 PROCEDURE — 86803 HEPATITIS C AB TEST: CPT | Performed by: FAMILY MEDICINE

## 2024-06-20 PROCEDURE — 82043 UR ALBUMIN QUANTITATIVE: CPT | Performed by: FAMILY MEDICINE

## 2024-06-20 PROCEDURE — 85027 COMPLETE CBC AUTOMATED: CPT | Performed by: FAMILY MEDICINE

## 2024-06-20 PROCEDURE — 99214 OFFICE O/P EST MOD 30 MIN: CPT | Performed by: FAMILY MEDICINE

## 2024-06-20 PROCEDURE — 80053 COMPREHEN METABOLIC PANEL: CPT | Performed by: FAMILY MEDICINE

## 2024-06-20 PROCEDURE — 82570 ASSAY OF URINE CREATININE: CPT | Performed by: FAMILY MEDICINE

## 2024-06-20 PROCEDURE — 84681 ASSAY OF C-PEPTIDE: CPT | Performed by: FAMILY MEDICINE

## 2024-06-20 PROCEDURE — 36415 COLL VENOUS BLD VENIPUNCTURE: CPT | Performed by: FAMILY MEDICINE

## 2024-06-20 PROCEDURE — 83036 HEMOGLOBIN GLYCOSYLATED A1C: CPT | Performed by: FAMILY MEDICINE

## 2024-06-20 PROCEDURE — 84443 ASSAY THYROID STIM HORMONE: CPT | Performed by: FAMILY MEDICINE

## 2024-06-20 ASSESSMENT — PAIN SCALES - GENERAL: PAINLEVEL: NO PAIN (0)

## 2024-06-21 ENCOUNTER — MYC MEDICAL ADVICE (OUTPATIENT)
Dept: FAMILY MEDICINE | Facility: CLINIC | Age: 39
End: 2024-06-21

## 2024-06-21 DIAGNOSIS — E11.69 TYPE 2 DIABETES MELLITUS WITH OTHER SPECIFIED COMPLICATION, WITHOUT LONG-TERM CURRENT USE OF INSULIN (H): Primary | ICD-10-CM

## 2024-06-21 LAB
ALBUMIN SERPL BCG-MCNC: 4 G/DL (ref 3.5–5.2)
ALP SERPL-CCNC: 122 U/L (ref 40–150)
ALT SERPL W P-5'-P-CCNC: 18 U/L (ref 0–70)
ANION GAP SERPL CALCULATED.3IONS-SCNC: 1 MMOL/L (ref 7–15)
AST SERPL W P-5'-P-CCNC: 14 U/L (ref 0–45)
BILIRUB SERPL-MCNC: 0.3 MG/DL
BUN SERPL-MCNC: 13.7 MG/DL (ref 6–20)
CALCIUM SERPL-MCNC: 7.9 MG/DL (ref 8.6–10)
CHLORIDE SERPL-SCNC: 96 MMOL/L (ref 98–107)
CHOLEST SERPL-MCNC: 214 MG/DL
CREAT SERPL-MCNC: 0.69 MG/DL (ref 0.67–1.17)
DEPRECATED HCO3 PLAS-SCNC: 38 MMOL/L (ref 22–29)
EGFRCR SERPLBLD CKD-EPI 2021: >90 ML/MIN/1.73M2
FASTING STATUS PATIENT QL REPORTED: NO
FASTING STATUS PATIENT QL REPORTED: NO
GLUCOSE SERPL-MCNC: 360 MG/DL (ref 70–99)
HDLC SERPL-MCNC: 35 MG/DL
LDLC SERPL CALC-MCNC: 133 MG/DL
NONHDLC SERPL-MCNC: 179 MG/DL
POTASSIUM SERPL-SCNC: 3.9 MMOL/L (ref 3.4–5.3)
PROT SERPL-MCNC: 7.6 G/DL (ref 6.4–8.3)
SODIUM SERPL-SCNC: 135 MMOL/L (ref 135–145)
TRIGL SERPL-MCNC: 231 MG/DL
TSH SERPL DL<=0.005 MIU/L-ACNC: 2.25 UIU/ML (ref 0.3–4.2)

## 2024-06-24 LAB — C PEPTIDE SERPL-MCNC: 4.6 NG/ML (ref 0.9–6.9)

## 2024-06-24 RX ORDER — GLIPIZIDE 5 MG/1
5 TABLET ORAL
Qty: 180 TABLET | Refills: 1 | Status: SHIPPED | OUTPATIENT
Start: 2024-06-24 | End: 2024-09-25

## 2024-06-24 NOTE — TELEPHONE ENCOUNTER
Please call and notify patient, I have sent medication called glipizide 5 mg 2 times  instead of jardiance daily with food along with metformin.  Always take  medication with food. No food no medication, follow up on 6 weeks in person for revaluation, if its not better we may need to consider insulin.

## 2024-08-14 ENCOUNTER — OFFICE VISIT (OUTPATIENT)
Dept: FAMILY MEDICINE | Facility: CLINIC | Age: 39
End: 2024-08-14
Payer: COMMERCIAL

## 2024-08-14 VITALS
DIASTOLIC BLOOD PRESSURE: 85 MMHG | HEIGHT: 71 IN | WEIGHT: 315 LBS | OXYGEN SATURATION: 98 % | BODY MASS INDEX: 44.1 KG/M2 | HEART RATE: 106 BPM | TEMPERATURE: 97 F | RESPIRATION RATE: 18 BRPM | SYSTOLIC BLOOD PRESSURE: 135 MMHG

## 2024-08-14 DIAGNOSIS — E11.69 TYPE 2 DIABETES MELLITUS WITH OTHER SPECIFIED COMPLICATION, WITHOUT LONG-TERM CURRENT USE OF INSULIN (H): ICD-10-CM

## 2024-08-14 DIAGNOSIS — E11.9 TYPE 2 DIABETES MELLITUS WITHOUT COMPLICATION, WITHOUT LONG-TERM CURRENT USE OF INSULIN (H): Primary | ICD-10-CM

## 2024-08-14 PROCEDURE — 99214 OFFICE O/P EST MOD 30 MIN: CPT | Performed by: FAMILY MEDICINE

## 2024-08-14 NOTE — PROGRESS NOTES
"  Assessment & Plan   Problem List Items Addressed This Visit       Diabetes mellitus, type 2 (H) - Primary      Continue glipizide and metformin - increase metformin to 1000mg BID (double his dose)    Follow up 6 weeks for repeat A1c  He was unable to recall home glucose numbers       BMI  Estimated body mass index is 47.25 kg/m  as calculated from the following:    Height as of this encounter: 1.791 m (5' 10.5\").    Weight as of this encounter: 151.5 kg (334 lb).     Diane Faye is a 38 year old, presenting for the following health issues:  RECHECK (Blood Sugar Levels) and Diabetes        8/14/2024    10:05 AM   Additional Questions   Roomed by Joseline CORONEL CMA     History of Present Illness       Diabetes:   He presents for follow up of diabetes.  He is checking home blood glucose a few times a month.   He checks blood glucose before meals.  Blood glucose is sometimes over 200 and never under 70.  When his blood glucose is low, the patient is asymptomatic for confusion, blurred vision, lethargy and reports not feeling dizzy, shaky, or weak.  He is concerned about other.    He is not experiencing numbness or burning in feet, excessive thirst, blurry vision, weight changes or redness, sores or blisters on feet. The patient has not had a diabetic eye exam in the last 12 months.          He eats 0-1 servings of fruits and vegetables daily.He consumes 1 sweetened beverage(s) daily.He exercises with enough effort to increase his heart rate 10 to 19 minutes per day.  He exercises with enough effort to increase his heart rate 3 or less days per week.   He is taking medications regularly.     [place]     Medication Followup of Metformin  Taking Medication as prescribed: yes  Side Effects:  None  Medication Helping Symptoms:  yes        Objective    /85 (BP Location: Right arm, Patient Position: Chair, Cuff Size: Adult Large)   Pulse 106   Temp 97  F (36.1  C) (Temporal)   Resp 18   " "Ht 1.791 m (5' 10.5\")   Wt (!) 151.5 kg (334 lb)   SpO2 98%   BMI 47.25 kg/m    Body mass index is 47.25 kg/m .  Physical Exam   GENERAL: alert and no distress  NECK: no adenopathy, no asymmetry, masses, or scars  RESP: lungs clear to auscultation - no rales, rhonchi or wheezes  CV: regular rate and rhythm, normal S1 S2, no S3 or S4, no murmur, click or rub, no peripheral edema  ABDOMEN: soft, nontender, no hepatosplenomegaly, no masses and bowel sounds normal  MS: no gross musculoskeletal defects noted, no edema  Diabetic foot exam: normal DP and PT pulses, no trophic changes or ulcerative lesions, and normal sensory exam   Office Visit on 06/20/2024   Component Date Value Ref Range Status    HIV Antigen Antibody Combo 06/20/2024 Nonreactive  Nonreactive Final    Negative HIV-1 p24 antigen and HIV-1/2 antibody screening test results usually indicate the absence of HIV-1 and HIV-2 infection. However, such negative results do not rule-out acute HIV infection.  If acute HIV-1 or HIV-2 infection is suspected, detection of HIV-1 or HIV-2 RNA  is recommended.     Hepatitis C Antibody 06/20/2024 Nonreactive  Nonreactive Final    A nonreactive screening test result does not exclude the possibility of exposure to or infection with HCV. Nonreactive screening test results in individuals with prior exposure to HCV may be due to antibody levels below the limit of detection of this assay or lack of reactivity to the HCV antigens used in this assay. Patients with recent HCV infections (<3 months from time of exposure) may have false-negative HCV antibody results due to the time needed for seroconversion (average of 8 to 9 weeks).    Hemoglobin A1C 06/20/2024 13.8 (H)  0.0 - 5.6 % Final    Normal <5.7%   Prediabetes 5.7-6.4%    Diabetes 6.5% or higher     Note: Adopted from ADA consensus guidelines.    Sodium 06/20/2024 135  135 - 145 mmol/L Final    Reference intervals for this test were updated on 09/26/2023 to more " accurately reflect our healthy population. There may be differences in the flagging of prior results with similar values performed with this method. Interpretation of those prior results can be made in the context of the updated reference intervals.     Potassium 06/20/2024 3.9  3.4 - 5.3 mmol/L Final    Carbon Dioxide (CO2) 06/20/2024 38 (H)  22 - 29 mmol/L Final    Anion Gap 06/20/2024 1 (L)  7 - 15 mmol/L Final    Urea Nitrogen 06/20/2024 13.7  6.0 - 20.0 mg/dL Final    Creatinine 06/20/2024 0.69  0.67 - 1.17 mg/dL Final    GFR Estimate 06/20/2024 >90  >60 mL/min/1.73m2 Final    eGFR calculated using 2021 CKD-EPI equation.    Calcium 06/20/2024 7.9 (L)  8.6 - 10.0 mg/dL Final    Chloride 06/20/2024 96 (L)  98 - 107 mmol/L Final    Glucose 06/20/2024 360 (H)  70 - 99 mg/dL Final    Alkaline Phosphatase 06/20/2024 122  40 - 150 U/L Final    AST 06/20/2024 14  0 - 45 U/L Final    Reference intervals for this test were updated on 6/12/2023 to more accurately reflect our healthy population. There may be differences in the flagging of prior results with similar values performed with this method. Interpretation of those prior results can be made in the context of the updated reference intervals.    ALT 06/20/2024 18  0 - 70 U/L Final    Reference intervals for this test were updated on 6/12/2023 to more accurately reflect our healthy population. There may be differences in the flagging of prior results with similar values performed with this method. Interpretation of those prior results can be made in the context of the updated reference intervals.      Protein Total 06/20/2024 7.6  6.4 - 8.3 g/dL Final    Albumin 06/20/2024 4.0  3.5 - 5.2 g/dL Final    Bilirubin Total 06/20/2024 0.3  <=1.2 mg/dL Final    Patient Fasting > 8hrs? 06/20/2024 No   Final    Cholesterol 06/20/2024 214 (H)  <200 mg/dL Final    Triglycerides 06/20/2024 231 (H)  <150 mg/dL Final    Direct Measure HDL 06/20/2024 35 (L)  >=40 mg/dL Final    LDL  Cholesterol Calculated 06/20/2024 133 (H)  <=100 mg/dL Final    Non HDL Cholesterol 06/20/2024 179 (H)  <130 mg/dL Final    Patient Fasting > 8hrs? 06/20/2024 No   Final    TSH 06/20/2024 2.25  0.30 - 4.20 uIU/mL Final    WBC Count 06/20/2024 8.9  4.0 - 11.0 10e3/uL Final    RBC Count 06/20/2024 5.46  4.40 - 5.90 10e6/uL Final    Hemoglobin 06/20/2024 15.0  13.3 - 17.7 g/dL Final    Hematocrit 06/20/2024 43.7  40.0 - 53.0 % Final    MCV 06/20/2024 80  78 - 100 fL Final    MCH 06/20/2024 27.5  26.5 - 33.0 pg Final    MCHC 06/20/2024 34.3  31.5 - 36.5 g/dL Final    RDW 06/20/2024 13.2  10.0 - 15.0 % Final    Platelet Count 06/20/2024 133 (L)  150 - 450 10e3/uL Final    Creatinine Urine mg/dL 06/20/2024 40.4  mg/dL Final    The reference ranges have not been established in urine creatinine. The results should be integrated into the clinical context for interpretation.    Albumin Urine mg/L 06/20/2024 <12.0  mg/L Final    The reference ranges have not been established in urine albumin. The results should be integrated into the clinical context for interpretation.    Albumin Urine mg/g Cr 06/20/2024    Final    Unable to calculate, urine albumin and/or urine creatinine is outside detectable limits.  Microalbuminuria is defined as an albumin:creatinine ratio of 17 to 299 for males and 25 to 299 for females. A ratio of albumin:creatinine of 300 or higher is indicative of overt proteinuria.  Due to biologic variability, positive results should be confirmed by a second, first-morning random or 24-hour timed urine specimen. If there is discrepancy, a third specimen is recommended. When 2 out of 3 results are in the microalbuminuria range, this is evidence for incipient nephropathy and warrants increased efforts at glucose control, blood pressure control, and institution of therapy with an angiotensin-converting-enzyme (ACE) inhibitor (if the patient can tolerate it).      C Peptide 06/20/2024 4.6  0.9 - 6.9 ng/mL Final            Signed Electronically by: NORRIS QUINN DO

## 2024-09-25 ENCOUNTER — OFFICE VISIT (OUTPATIENT)
Dept: FAMILY MEDICINE | Facility: CLINIC | Age: 39
End: 2024-09-25
Payer: COMMERCIAL

## 2024-09-25 VITALS
SYSTOLIC BLOOD PRESSURE: 150 MMHG | HEIGHT: 71 IN | OXYGEN SATURATION: 97 % | TEMPERATURE: 97.8 F | HEART RATE: 106 BPM | WEIGHT: 315 LBS | DIASTOLIC BLOOD PRESSURE: 92 MMHG | RESPIRATION RATE: 18 BRPM | BODY MASS INDEX: 44.1 KG/M2

## 2024-09-25 DIAGNOSIS — E11.69 TYPE 2 DIABETES MELLITUS WITH OTHER SPECIFIED COMPLICATION, WITHOUT LONG-TERM CURRENT USE OF INSULIN (H): Primary | ICD-10-CM

## 2024-09-25 DIAGNOSIS — Z00.00 ROUTINE GENERAL MEDICAL EXAMINATION AT A HEALTH CARE FACILITY: ICD-10-CM

## 2024-09-25 LAB
EST. AVERAGE GLUCOSE BLD GHB EST-MCNC: 220 MG/DL
HBA1C MFR BLD: 9.3 % (ref 0–5.6)

## 2024-09-25 PROCEDURE — 99396 PREV VISIT EST AGE 40-64: CPT | Mod: 25 | Performed by: FAMILY MEDICINE

## 2024-09-25 PROCEDURE — 90656 IIV3 VACC NO PRSV 0.5 ML IM: CPT | Performed by: FAMILY MEDICINE

## 2024-09-25 PROCEDURE — 83036 HEMOGLOBIN GLYCOSYLATED A1C: CPT | Performed by: FAMILY MEDICINE

## 2024-09-25 PROCEDURE — 90471 IMMUNIZATION ADMIN: CPT | Performed by: FAMILY MEDICINE

## 2024-09-25 PROCEDURE — 36415 COLL VENOUS BLD VENIPUNCTURE: CPT | Performed by: FAMILY MEDICINE

## 2024-09-25 RX ORDER — GLIPIZIDE 5 MG/1
5 TABLET ORAL
Qty: 180 TABLET | Refills: 1 | Status: SHIPPED | OUTPATIENT
Start: 2024-09-25

## 2024-09-25 NOTE — PROGRESS NOTES
"Preventive Care Visit  [facility]  NORRIS QUINN DO, Family Medicine  Sep 25, 2024        Assessment & Plan   Problem List Items Addressed This Visit       Diabetes mellitus, type 2 (H) - Primary    Relevant Medications    glipiZIDE (GLUCOTROL) 5 MG tablet    Other Relevant Orders    OPTOMETRY REFERRAL    HEMOGLOBIN A1C (Completed)     Other Visit Diagnoses       Routine general medical examination at a health care facility            Stable chronic disease follow-up 3 months    Patient has been advised of split billing requirements and indicates understanding: Yes       BMI  Estimated body mass index is 47.81 kg/m  as calculated from the following:    Height as of this encounter: 1.791 m (5' 10.5\").    Weight as of this encounter: 153.3 kg (338 lb).       Counseling  Appropriate preventive services were addressed with this patient via screening, questionnaire, or discussion as appropriate for fall prevention, nutrition, physical activity, Tobacco-use cessation, social engagement, weight loss and cognition.  Checklist reviewing preventive services available has been given to the patient.  Reviewed patient's diet, addressing concerns and/or questions.   He is at risk for lack of exercise and has been provided with information to increase physical activity for the benefit of his well-being.         Diane Faye is a 39 year old, presenting for the following:  Physical and Diabetes        9/25/2024     8:03 AM   Additional Questions   Roomed by Joseline CORONEL CMA        Health Care Directive  Patient does not have a Health Care Directive or Living Will: Discussed advance care planning with patient; information given to patient to review.    HPI      Diabetes Follow-up    How often are you checking your blood sugar? Not at all  What concerns do you have today about your diabetes? None   Do you have any of these symptoms? (Select all that apply)  No numbness or tingling in feet.  No redness, " sores or blisters on feet.  No complaints of excessive thirst.  No reports of blurry vision.  No significant changes to weight.  Have you had a diabetic eye exam in the last 12 months? No        BP Readings from Last 2 Encounters:   09/25/24 (!) 150/92   08/14/24 135/85     Hemoglobin A1C (%)   Date Value   09/25/2024 9.3 (H)   06/20/2024 13.8 (H)     LDL Cholesterol Calculated (mg/dL)   Date Value   06/20/2024 133 (H)   08/24/2004 140 (H)               9/25/2024   General Health   How would you rate your overall physical health? Good   Feel stress (tense, anxious, or unable to sleep) Not at all            9/25/2024   Nutrition   Three or more servings of calcium each day? (!) NO   Diet: I don't know   How many servings of fruit and vegetables per day? (!) 0-1   How many sweetened beverages each day? 0-1            9/25/2024   Exercise   Days per week of moderate/strenous exercise 1 day      (!) EXERCISE CONCERN      9/25/2024   Social Factors   Frequency of gathering with friends or relatives Once a week   Worry food won't last until get money to buy more No   Food not last or not have enough money for food? No   Do you have housing? (Housing is defined as stable permanent housing and does not include staying ouside in a car, in a tent, in an abandoned building, in an overnight shelter, or couch-surfing.) Yes   Are you worried about losing your housing? No   Lack of transportation? No   Unable to get utilities (heat,electricity)? No            9/25/2024   Dental   Dentist two times every year? (!) DECLINE            9/25/2024   TB Screening   Were you born outside of the [place]? No                  9/25/2024   Substance Use   Alcohol more than 3/day or more than 7/wk No   Do you use any other substances recreationally? No        Social History     Tobacco Use    Smoking status: Never     Passive exposure: Never    Smokeless tobacco: Never   Vaping Use    Vaping status: Never Used   Substance Use Topics    Alcohol use:  "Yes     Comment: social    Drug use: Never           9/25/2024   STI Screening   New sexual partner(s) since last STI/HIV test? No      ASCVD Risk   The 10-year ASCVD risk score (Nakul HICKS, et al., 2019) is: 5.4%    Values used to calculate the score:      Age: 39 years      Sex: Male      Is Non- : No      Diabetic: Yes      Tobacco smoker: No      Systolic Blood Pressure: 150 mmHg      Is BP treated: No      HDL Cholesterol: 35 mg/dL      Total Cholesterol: 214 mg/dL        9/25/2024   Contraception/Family Planning   Questions about contraception or family planning No           Reviewed and updated as needed this visit by Provider                          Review of Systems  Constitutional, HEENT, cardiovascular, pulmonary, gi and gu systems are negative, except as otherwise noted.     Objective    Exam  BP (!) 150/92 (BP Location: Right arm, Patient Position: Chair, Cuff Size: Adult Large)   Pulse 106   Temp 97.8  F (36.6  C) (Temporal)   Resp 18   Ht 1.791 m (5' 10.5\")   Wt (!) 153.3 kg (338 lb)   SpO2 97%   BMI 47.81 kg/m     Estimated body mass index is 47.81 kg/m  as calculated from the following:    Height as of this encounter: 1.791 m (5' 10.5\").    Weight as of this encounter: 153.3 kg (338 lb).    Physical Exam  GENERAL: alert and no distress  NECK: no adenopathy, no asymmetry, masses, or scars  RESP: lungs clear to auscultation - no rales, rhonchi or wheezes  CV: regular rate and rhythm, normal S1 S2, no S3 or S4, no murmur, click or rub, no peripheral edema  ABDOMEN: soft, nontender, no hepatosplenomegaly, no masses and bowel sounds normal  MS: no gross musculoskeletal defects noted, no edema        Signed Electronically by: NORRIS QUINN DO    "

## 2024-09-25 NOTE — PATIENT INSTRUCTIONS
Patient Education   Preventive Care Advice   This is general advice given by our system to help you stay healthy. However, your care team may have specific advice just for you. Please talk to your care team about your preventive care needs.  Nutrition  Eat 5 or more servings of fruits and vegetables each day.  Try wheat bread, brown rice and whole grain pasta (instead of white bread, rice, and pasta).  Get enough calcium and vitamin D. Check the label on foods and aim for 100% of the RDA (recommended daily allowance).  Lifestyle  Exercise at least 150 minutes each week  (30 minutes a day, 5 days a week).  Do muscle strengthening activities 2 days a week. These help control your weight and prevent disease.  No smoking.  Wear sunscreen to prevent skin cancer.  Have a dental exam and cleaning every 6 months.  Yearly exams  See your health care team every year to talk about:  Any changes in your health.  Any medicines your care team has prescribed.  Preventive care, family planning, and ways to prevent chronic diseases.  Shots (vaccines)   HPV shots (up to age 26), if you've never had them before.  Hepatitis B shots (up to age 59), if you've never had them before.  COVID-19 shot: Get this shot when it's due.  Flu shot: Get a flu shot every year.  Tetanus shot: Get a tetanus shot every 10 years.  Pneumococcal, hepatitis A, and RSV shots: Ask your care team if you need these based on your risk.  Shingles shot (for age 50 and up)  General health tests  Diabetes screening:  Starting at age 35, Get screened for diabetes at least every 3 years.  If you are younger than age 35, ask your care team if you should be screened for diabetes.  Cholesterol test: At age 39, start having a cholesterol test every 5 years, or more often if advised.  Bone density scan (DEXA): At age 50, ask your care team if you should have this scan for osteoporosis (brittle bones).  Hepatitis C: Get tested at least once in your life.  STIs (sexually  transmitted infections)  Before age 24: Ask your care team if you should be screened for STIs.  After age 24: Get screened for STIs if you're at risk. You are at risk for STIs (including HIV) if:  You are sexually active with more than one person.  You don't use condoms every time.  You or a partner was diagnosed with a sexually transmitted infection.  If you are at risk for HIV, ask about PrEP medicine to prevent HIV.  Get tested for HIV at least once in your life, whether you are at risk for HIV or not.  Cancer screening tests  Cervical cancer screening: If you have a cervix, begin getting regular cervical cancer screening tests starting at age 21.  Breast cancer scan (mammogram): If you've ever had breasts, begin having regular mammograms starting at age 40. This is a scan to check for breast cancer.  Colon cancer screening: It is important to start screening for colon cancer at age 45.  Have a colonoscopy test every 10 years (or more often if you're at risk) Or, ask your provider about stool tests like a FIT test every year or Cologuard test every 3 years.  To learn more about your testing options, visit:   .  For help making a decision, visit:   https://bit.ly/gl12212.  Prostate cancer screening test: If you have a prostate, ask your care team if a prostate cancer screening test (PSA) at age 55 is right for you.  Lung cancer screening: If you are a current or former smoker ages 50 to 80, ask your care team if ongoing lung cancer screenings are right for you.  For informational purposes only. Not to replace the advice of your health care provider. Copyright   2023 [employer]. All rights reserved. Clinically reviewed by the [facility]. PushPage 690613 - REV 01/24.

## 2024-10-11 ENCOUNTER — TELEPHONE (OUTPATIENT)
Dept: FAMILY MEDICINE | Facility: CLINIC | Age: 39
End: 2024-10-11
Payer: COMMERCIAL

## 2024-10-11 NOTE — TELEPHONE ENCOUNTER
Patient Quality Outreach    Patient is due for the following:   Diabetes -  A1C    Next Steps:   Patient has upcoming appointment, these items will be addressed at that time.    Type of outreach:    Appointment made while patient was in clinic.      Questions for provider review:    None           Joseline Guilluame CMA  Chart routed to Care Team.

## 2024-12-29 ENCOUNTER — HEALTH MAINTENANCE LETTER (OUTPATIENT)
Age: 39
End: 2024-12-29